# Patient Record
Sex: FEMALE | Race: BLACK OR AFRICAN AMERICAN | NOT HISPANIC OR LATINO | Employment: OTHER | URBAN - NONMETROPOLITAN AREA
[De-identification: names, ages, dates, MRNs, and addresses within clinical notes are randomized per-mention and may not be internally consistent; named-entity substitution may affect disease eponyms.]

---

## 2023-06-06 ENCOUNTER — OFFICE VISIT (OUTPATIENT)
Dept: NEUROSURGERY | Facility: CLINIC | Age: 48
End: 2023-06-06
Payer: MEDICARE

## 2023-06-06 ENCOUNTER — HOSPITAL ENCOUNTER (OUTPATIENT)
Dept: GENERAL RADIOLOGY | Facility: HOSPITAL | Age: 48
Discharge: HOME | End: 2023-06-06
Admitting: NURSE PRACTITIONER
Payer: MEDICARE

## 2023-06-06 VITALS — WEIGHT: 256.8 LBS | HEIGHT: 64 IN | BODY MASS INDEX: 43.84 KG/M2

## 2023-06-06 DIAGNOSIS — M79.604 PAIN OF RIGHT LOWER EXTREMITY: ICD-10-CM

## 2023-06-06 DIAGNOSIS — R20.0 NUMBNESS AND TINGLING OF RIGHT LOWER EXTREMITY: ICD-10-CM

## 2023-06-06 DIAGNOSIS — M54.50 LUMBAR BACK PAIN: Primary | ICD-10-CM

## 2023-06-06 DIAGNOSIS — Z72.0 TOBACCO ABUSE: ICD-10-CM

## 2023-06-06 DIAGNOSIS — R20.2 NUMBNESS AND TINGLING OF RIGHT LOWER EXTREMITY: ICD-10-CM

## 2023-06-06 DIAGNOSIS — M54.50 LUMBAR BACK PAIN: ICD-10-CM

## 2023-06-06 DIAGNOSIS — E66.01 CLASS 3 SEVERE OBESITY DUE TO EXCESS CALORIES WITH SERIOUS COMORBIDITY AND BODY MASS INDEX (BMI) OF 40.0 TO 44.9 IN ADULT: ICD-10-CM

## 2023-06-06 PROCEDURE — 1159F MED LIST DOCD IN RCRD: CPT | Performed by: NURSE PRACTITIONER

## 2023-06-06 PROCEDURE — 99204 OFFICE O/P NEW MOD 45 MIN: CPT | Performed by: NURSE PRACTITIONER

## 2023-06-06 PROCEDURE — 72114 X-RAY EXAM L-S SPINE BENDING: CPT

## 2023-06-06 PROCEDURE — 1160F RVW MEDS BY RX/DR IN RCRD: CPT | Performed by: NURSE PRACTITIONER

## 2023-06-06 RX ORDER — DAPAGLIFLOZIN 10 MG/1
TABLET, FILM COATED ORAL EVERY 24 HOURS
COMMUNITY

## 2023-06-06 RX ORDER — LEVOTHYROXINE SODIUM 200 MCG
TABLET ORAL EVERY 24 HOURS
COMMUNITY

## 2023-06-06 RX ORDER — DULAGLUTIDE 0.75 MG/.5ML
INJECTION, SOLUTION SUBCUTANEOUS
COMMUNITY
Start: 2023-04-05

## 2023-06-06 RX ORDER — ALLOPURINOL 100 MG/1
TABLET ORAL EVERY 12 HOURS SCHEDULED
COMMUNITY

## 2023-06-06 RX ORDER — CYCLOBENZAPRINE HCL 10 MG
TABLET ORAL
COMMUNITY

## 2023-06-06 NOTE — PROGRESS NOTES
Primary Care Provider: Melba Alvarado APRN  Requesting Provider: Melba Alavrado APRN    Chief Complaint:   Chief Complaint   Patient presents with    Back Pain     Pt is here with complaints of back pain, SI joint pain and rt leg pain.       History of Present Illness  Consultation at the request of NAVEEN Laboy.    HPI:  Kathy Betancourt is a 47 y.o. female who presents today with a complaint of lumbar back and right leg pain, 50% back, 50% right leg.  History of right SI joint fusion performed at [place] for right SI joint pain that did not improve postoperatively.    Gradual onset of lumbar back pain over the past 20+ years with progressively worsening discomfort over the past year.  Her lumbar back pain is constant, waxing and waning in severity, primarily bilateral SI joint pain, right > left.  Her discomfort radiates to the right groin and extends down the medial thigh to the knee.  She additionally reports intermittent numbness and tingling in the same distribution.  She denies lower extremity weakness or falls, however reports severe right knee pain due to a torn right meniscus and Baker's cyst.  She is currently under the care of [facility].  Her back and right leg pain worsen with prolonged sitting, standing, walking, physical activity.  Minimal alleviation of her discomfort with application of ice and/or heat, ibuprofen, Tylenol arthritis, and Flexeril.  She trialed Celebrex, however was unable to tolerate medication due to fluid retention.  Ms. Betancourt denies fevers, chills, night sweats, unexplained weight loss, saddle anesthesia, or bowel bladder dysfunction. He currently rates the severity of her symptoms 10/10.  No additional concerns at this time.  Vitamin D deficiency,    Ms. Betancourt has not completed a dedicated course of physician directed physical therapy, massage care, chiropractic care, nor been evaluated by pain management.      Oswestry Disability Index = 68%   Score   Pain  Intensity Very severe pain-4   Personal Care I can look after myself but it is slow and painful-2   Lifting Medium weights off a table-3   Walking Pain prevents > 100 yards-3   Sitting Pain prevents sitting > 10 min-4   Standing Pain limits standing to < 10 min-4   Sleeping Can only sleep < 2 hrs-4   Sex Life (if applicable) Sex severely restricted by pain-4   Social Life Pain restricts me to my home-4   Traveling Pain restricts to <30 min-4   (Ridgeway et al, 1980)    SCORE INTERPRETATION OF THE OSWESTRY LBP DISABILITY QUESTIONNAIRE     60-80% Crippled Back pain impinges on all aspects of these patients' lives both at home and at work. Positive intervention is required.     ROS  Review of Systems   Constitutional:  Positive for activity change, appetite change, chills and fatigue.   HENT:  Positive for sinus pressure and sneezing.    Eyes: Negative.    Respiratory:  Positive for apnea.    Cardiovascular: Negative.    Gastrointestinal: Negative.    Endocrine: Positive for cold intolerance and heat intolerance.   Genitourinary: Negative.    Musculoskeletal:  Positive for back pain, joint swelling, neck pain and neck stiffness.   Skin: Negative.    Allergic/Immunologic: Positive for environmental allergies and food allergies.   Neurological: Negative.    Hematological: Negative.    Psychiatric/Behavioral: Negative.     All other systems reviewed and are negative.    Past Medical History:   Diagnosis Date    Arthritis     Asthma     Low back pain      Past Surgical History:   Procedure Laterality Date    APPENDECTOMY      CHOLECYSTECTOMY      HERNIA REPAIR       Family History: family history includes Heart disease in her father; Liver disease in her mother.    Social History:  reports that she has been smoking cigarettes. She has never used smokeless tobacco. Drug use questions deferred to the physician. She reports that she does not drink alcohol.    Medications:    Current Outpatient Medications:     allopurinol  "(ZYLOPRIM) 100 MG tablet, Every 12 (Twelve) Hours., Disp: , Rfl:     Cholecalciferol 50 MCG (2000 UT) tablet, Daily., Disp: , Rfl:     cyclobenzaprine (FLEXERIL) 10 MG tablet, 1 tablet at bedtime as needed Orally as needed, Disp: , Rfl:     dapagliflozin Propanediol (Farxiga) 10 MG tablet, Daily., Disp: , Rfl:     Synthroid 200 MCG tablet, Daily., Disp: , Rfl:     Trulicity 0.75 MG/0.5ML solution pen-injector, INJECT 0.75 MG SUBCUTANEOUSLY ONCE A WEEK FOR 28 DAYS, Disp: , Rfl:     Allergies:  Metformin and Morphine    Objective   Ht 162.6 cm (64\")   Wt 116 kg (256 lb 12.8 oz)   BMI 44.08 kg/m²   Physical Exam  Vitals and nursing note reviewed.   Constitutional:       General: She is not in acute distress.     Appearance: Normal appearance. She is well-developed and well-groomed. She is morbidly obese. She is not ill-appearing, toxic-appearing or diaphoretic.      Comments: BMI 44.08   HENT:      Head: Normocephalic and atraumatic.      Right Ear: Hearing normal.      Left Ear: Hearing normal.   Eyes:      Extraocular Movements: EOM normal.      Conjunctiva/sclera: Conjunctivae normal.      Pupils: Pupils are equal, round, and reactive to light.   Neck:      Trachea: Trachea normal.   Cardiovascular:      Rate and Rhythm: Normal rate and regular rhythm.   Pulmonary:      Effort: Pulmonary effort is normal. No tachypnea, bradypnea, accessory muscle usage or respiratory distress.   Abdominal:      Palpations: Abdomen is soft.   Musculoskeletal:      Cervical back: Full passive range of motion without pain and neck supple.   Skin:     General: Skin is warm and dry.   Neurological:      Mental Status: She is alert and oriented to person, place, and time.      GCS: GCS eye subscore is 4. GCS verbal subscore is 5. GCS motor subscore is 6.      Deep Tendon Reflexes:      Reflex Scores:       Tricep reflexes are 1+ on the right side and 1+ on the left side.       Bicep reflexes are 1+ on the right side and 1+ on the left " side.       Brachioradialis reflexes are 1+ on the right side and 1+ on the left side.       Patellar reflexes are 1+ on the right side and 1+ on the left side.       Achilles reflexes are 0 on the right side and 0 on the left side.  Psychiatric:         Speech: Speech normal.         Behavior: Behavior normal. Behavior is cooperative.     Neurologic Exam     Mental Status   Oriented to person, place, and time.   Attention: normal. Concentration: normal.   Speech: speech is normal   Level of consciousness: alert    Cranial Nerves     CN II   Visual fields full to confrontation.     CN III, IV, VI   Pupils are equal, round, and reactive to light.  Extraocular motions are normal.     CN V   Facial sensation intact.     CN VII   Facial expression full, symmetric.     CN VIII   CN VIII normal.     CN IX, X   CN IX normal.     CN XI   CN XI normal.     Motor Exam   Right arm tone: normal  Left arm tone: normal  Right leg tone: normal  Left leg tone: normal    Strength   Right deltoid: 5/5  Left deltoid: 5/5  Right biceps: 5/5  Left biceps: 5/5  Right triceps: 5/5  Left triceps: 5/5  Right wrist extension: 5/5  Left wrist extension: 5/5  Right iliopsoas: 5/5  Left iliopsoas: 5/5  Right quadriceps: 5/5  Left quadriceps: 5/5  Right anterior tibial: 5/5  Left anterior tibial: 5/5  Right gastroc: 5/5  Left gastroc: 5/5  Right EHL 5/5  Left EHL 5/5       Sensory Exam   Right arm light touch: normal  Left arm light touch: normal  Right leg light touch: normal  Left leg light touch: normal    Gait, Coordination, and Reflexes     Tremor   Resting tremor: absent  Intention tremor: absent  Action tremor: absent    Reflexes   Right brachioradialis: 1+  Left brachioradialis: 1+  Right biceps: 1+  Left biceps: 1+  Right triceps: 1+  Left triceps: 1+  Right patellar: 1+  Left patellar: 1+  Right achilles: 0  Left achilles: 0  Right plantar: normal  Left plantar: normal  Right Osullivan: absent  Left Osullivan: absent  Right ankle clonus:  absent  Left ankle clonus: absent  Right pendular knee jerk: absent  Left pendular knee jerk: absent  Antalgic gait     Imaging: (independent review and interpretation)  No radiology results for the last 30 days.     ASSESSMENT and PLAN  Kathy Betancourt is a 47 y.o. female with a significant medical history of right SI joint fusion (2021), vitamin D deficiency, hypothyroidism, diabetes, tobacco abuse, and morbid obesity.  She presents with a new problem of axial low back pain and right medial thigh pain, 50% back, 50% right leg. CHELSEA: 69.  Physical exam findings of gross hyporeflexia and an antalgic gait.  No radiology results for the last 30 days.    RECOMMENDATIONS ...  Lumbar back pain  Bilateral SI joint pain, right greater than left  History of right SI joint fusion (2021)  Right medial thigh pain and dysesthesias  Differential diagnosis include, but are not limited to spondylolisthesis with concern for mechanical instability, degenerative facet arthropathy, Lumbar stenosis with right lower extremity radiculopathy, spondyloarthropathies including ankylosis spondylitis ([id number]) or Paget's disease  , fibromyalgia or other rheumatological disease, malingering, conversion disorder, and secondary gain are diagnoses of exclusion, or failure of right SI joint fusion .     For further evaluation we will proceed today by obtaining x-rays of the lumbar spine complete with flexion and extension to assess for areas of instability.  As a means of first-line conservative management for lumbar back pain, we will send  Kathy for a dedicated course of physician directed physical therapy; Rx provided.  We will have her return for reassessment with me after completion of physical therapy.  I advised the patient to call to return sooner for new or worsening complaints of weakness, paresthesias, gait disturbances, or any additional concerns.  Treatment options discussed in detail with Kathy and she voiced understanding.  Ms. Betancourt agrees  with this plan of care.    Tobacco abuse  The patient understands the many dangers of continuing to use tobacco.  If quitting becomes an increased priority Kathy knows to contact us for help with quitting.       Class 3 obesity (BMI >= 40) due to excessive calories with serious comorbidities BMI of 40.0-44.9 in adult  Body mass index is 44.08 kg/m². Information on the DASH diet provided in the AVS.  We will continue to provided diet and exercise information with the goal of weight loss at each scheduled appointment.     Diagnoses and all orders for this visit:    1. Lumbar back pain (Primary)  -     XR Spine Lumbar Complete With Flex & Ext; Future  -     Ambulatory Referral to Physical Therapy Evaluate and treat (2 to 3 times weekly for 6 weeks)    2. Pain of right lower extremity  -     XR Spine Lumbar Complete With Flex & Ext; Future  -     Ambulatory Referral to Physical Therapy Evaluate and treat (2 to 3 times weekly for 6 weeks)    3. Numbness and tingling of right lower extremity  -     XR Spine Lumbar Complete With Flex & Ext; Future  -     Ambulatory Referral to Physical Therapy Evaluate and treat (2 to 3 times weekly for 6 weeks)    4. Tobacco abuse    5. Class 3 severe obesity due to excess calories with serious comorbidity and body mass index (BMI) of 40.0 to 44.9 in adult      Return for FOLLOW WITH ANSELMO AFTER COMPLETION OF PT.    Thank you for this Consultation and the opportunity to participate in Kathy's care.    Sincerely,  Anselmo Maciel, NAVEEN    Medical Decision Making (2/3)  Problem Points (2,3,4 or more)  New Problem, additional workup = 4x1  Data Points (2,3,4 or more)  Ordered Imaging (X-ray,CT, MRI) = 1x1.  Risk (Low, Mod, High)  Undiagnosed New problem (Moderate)    E/M = MDM 3 out of 3   or   TIME  New Level 4 - 52938 = Mod (3, 3, Mod)   or   45-59 minutes

## 2023-06-06 NOTE — PATIENT INSTRUCTIONS
"DASH Eating Plan  DASH stands for Dietary Approaches to Stop Hypertension. The DASH eating plan is a healthy eating plan that has been shown to:  Reduce high blood pressure (hypertension).  Reduce your risk for type 2 diabetes, heart disease, and stroke.  Help with weight loss.  What are tips for following this plan?  Reading food labels  Check food labels for the amount of salt (sodium) per serving. Choose foods with less than 5 percent of the Daily Value of sodium. Generally, foods with less than 300 milligrams (mg) of sodium per serving fit into this eating plan.  To find whole grains, look for the word \"whole\" as the first word in the ingredient list.  Shopping  Buy products labeled as \"low-sodium\" or \"no salt added.\"  Buy fresh foods. Avoid canned foods and pre-made or frozen meals.  Cooking  Avoid adding salt when cooking. Use salt-free seasonings or herbs instead of table salt or sea salt. Check with your health care provider or pharmacist before using salt substitutes.  Do not vázquez foods. Cook foods using healthy methods such as baking, boiling, grilling, roasting, and broiling instead.  Cook with heart-healthy oils, such as olive, canola, avocado, soybean, or sunflower oil.  Meal planning    Eat a balanced diet that includes:  4 or more servings of fruits and 4 or more servings of vegetables each day. Try to fill one-half of your plate with fruits and vegetables.  6-8 servings of whole grains each day.  Less than 6 oz (170 g) of lean meat, poultry, or fish each day. A 3-oz (85-g) serving of meat is about the same size as a deck of cards. One egg equals 1 oz (28 g).  2-3 servings of low-fat dairy each day. One serving is 1 cup (237 mL).  1 serving of nuts, seeds, or beans 5 times each week.  2-3 servings of heart-healthy fats. Healthy fats called omega-3 fatty acids are found in foods such as walnuts, flaxseeds, fortified milks, and eggs. These fats are also found in cold-water fish, such as sardines, salmon, " and mackerel.  Limit how much you eat of:  Canned or prepackaged foods.  Food that is high in trans fat, such as some fried foods.  Food that is high in saturated fat, such as fatty meat.  Desserts and other sweets, sugary drinks, and other foods with added sugar.  Full-fat dairy products.  Do not salt foods before eating.  Do not eat more than 4 egg yolks a week.  Try to eat at least 2 vegetarian meals a week.  Eat more home-cooked food and less restaurant, buffet, and fast food.  Lifestyle  When eating at a restaurant, ask that your food be prepared with less salt or no salt, if possible.  If you drink alcohol:  Limit how much you use to:  0-1 drink a day for women who are not pregnant.  0-2 drinks a day for men.  Be aware of how much alcohol is in your drink. In the [place] one drink equals one 12 oz bottle of beer (355 mL), one 5 oz glass of wine (148 mL), or one 1½ oz glass of hard liquor (44 mL).  General information  Avoid eating more than 2,300 mg of salt a day. If you have hypertension, you may need to reduce your sodium intake to 1,500 mg a day.  Work with your health care provider to maintain a healthy body weight or to lose weight. Ask what an ideal weight is for you.  Get at least 30 minutes of exercise that causes your heart to beat faster (aerobic exercise) most days of the week. Activities may include walking, swimming, or biking.  Work with your health care provider or dietitian to adjust your eating plan to your individual calorie needs.  What foods should I eat?  Fruits  All fresh, dried, or frozen fruit. Canned fruit in natural juice (without added sugar).  Vegetables  Fresh or frozen vegetables (raw, steamed, roasted, or grilled). Low-sodium or reduced-sodium tomato and vegetable juice. Low-sodium or reduced-sodium tomato sauce and tomato paste. Low-sodium or reduced-sodium canned vegetables.  Grains  Whole-grain or whole-wheat bread. Whole-grain or whole-wheat pasta. Brown rice. Oatmeal. Quinoa.  Bulgur. Whole-grain and low-sodium cereals. Megan bread. Low-fat, low-sodium crackers. Whole-wheat flour tortillas.  Meats and other proteins  Skinless chicken or turkey. Ground chicken or turkey. Pork with fat trimmed off. Fish and seafood. Egg whites. Dried beans, peas, or lentils. Unsalted nuts, nut butters, and seeds. Unsalted canned beans. Lean cuts of beef with fat trimmed off. Low-sodium, lean precooked or cured meat, such as sausages or meat loaves.  Dairy  Low-fat (1%) or fat-free (skim) milk. Reduced-fat, low-fat, or fat-free cheeses. Nonfat, low-sodium ricotta or cottage cheese. Low-fat or nonfat yogurt. Low-fat, low-sodium cheese.  Fats and oils  Soft margarine without trans fats. Vegetable oil. Reduced-fat, low-fat, or light mayonnaise and salad dressings (reduced-sodium). Canola, safflower, olive, avocado, soybean, and sunflower oils. Avocado.  Seasonings and condiments  Herbs. Spices. Seasoning mixes without salt.  Other foods  Unsalted popcorn and pretzels. Fat-free sweets.  The items listed above may not be a complete list of foods and beverages you can eat. Contact a dietitian for more information.  What foods should I avoid?  Fruits  Canned fruit in a light or heavy syrup. Fried fruit. Fruit in cream or butter sauce.  Vegetables  Creamed or fried vegetables. Vegetables in a cheese sauce. Regular canned vegetables (not low-sodium or reduced-sodium). Regular canned tomato sauce and paste (not low-sodium or reduced-sodium). Regular tomato and vegetable juice (not low-sodium or reduced-sodium). Pickles. Olives.  Grains  Baked goods made with fat, such as croissants, muffins, or some breads. Dry pasta or rice meal packs.  Meats and other proteins  Fatty cuts of meat. Ribs. Fried meat. Orta. Bologna, salami, and other precooked or cured meats, such as sausages or meat loaves. Fat from the back of a pig (fatback). Bratwurst. Salted nuts and seeds. Canned beans with added salt. Canned or smoked fish.  Whole eggs or egg yolks. Chicken or turkey with skin.  Dairy  Whole or 2% milk, cream, and half-and-half. Whole or full-fat cream cheese. Whole-fat or sweetened yogurt. Full-fat cheese. Nondairy creamers. Whipped toppings. Processed cheese and cheese spreads.  Fats and oils  Butter. Stick margarine. Lard. Shortening. Ghee. Orta fat. Tropical oils, such as coconut, palm kernel, or palm oil.  Seasonings and condiments  Onion salt, garlic salt, seasoned salt, table salt, and sea salt. Worcestershire sauce. Tartar sauce. Barbecue sauce. Teriyaki sauce. Soy sauce, including reduced-sodium. Steak sauce. Canned and packaged gravies. Fish sauce. Oyster sauce. Cocktail sauce. Store-bought horseradish. Ketchup. Mustard. Meat flavorings and tenderizers. Bouillon cubes. Hot sauces. Pre-made or packaged marinades. Pre-made or packaged taco seasonings. Relishes. Regular salad dressings.  Other foods  Salted popcorn and pretzels.  The items listed above may not be a complete list of foods and beverages you should avoid. Contact a dietitian for more information.  Where to find more information  National Heart, Lung, and Blood [place]: www.nhlbi.nih.gov  American Heart Association: www.heart.org  Academy of Nutrition and Dietetics: www.eatright.org  National Kidney Foundation: www.kidney.org  Summary  The DASH eating plan is a healthy eating plan that has been shown to reduce high blood pressure (hypertension). It may also reduce your risk for type 2 diabetes, heart disease, and stroke.  When on the DASH eating plan, aim to eat more fresh fruits and vegetables, whole grains, lean proteins, low-fat dairy, and heart-healthy fats.  With the DASH eating plan, you should limit salt (sodium) intake to 2,300 mg a day. If you have hypertension, you may need to reduce your sodium intake to 1,500 mg a day.  Work with your health care provider or dietitian to adjust your eating plan to your individual calorie needs.  This information is not  intended to replace advice given to you by your health care provider. Make sure you discuss any questions you have with your health care provider.  Document Revised: 11/20/2020 Document Reviewed: 11/20/2020  Elsevier Patient Education © 2022 [employer]  Tobacco Use Disorder  Tobacco use disorder (TUD) occurs when a person craves, seeks, and uses tobacco, regardless of the consequences. This disorder can cause problems with mental and physical health. It can affect your ability to have healthy relationships, and it can keep you from meeting your responsibilities at work, home, or school.  Tobacco may be:  Smoked as a cigarette or cigar.  Inhaled using e-cigarettes.  Smoked in a pipe or hookah.  Chewed as smokeless tobacco.  Inhaled into the nostrils as snuff.  Tobacco products contain a dangerous chemical called nicotine, which is very addictive. Nicotine triggers hormones that make the body feel stimulated and works on areas of the brain that make you feel good. These effects can make it hard for people to quit nicotine.  Tobacco contains many other unsafe chemicals that can damage almost every organ in the body. Smoking tobacco also puts others in danger due to fire risk and possible health problems caused by breathing in secondhand smoke.  What are the signs or symptoms?  Symptoms of TUD may include:  Being unable to slow down or stop your tobacco use.  Spending an abnormal amount of time getting or using tobacco.  Craving tobacco. Cravings may last for up to 6 months after quitting.  Tobacco use that:  Interferes with your work, school, or home life.  Interferes with your personal and social relationships.  Makes you give up activities that you once enjoyed or found important.  Using tobacco even though you know that it is:  Dangerous or bad for your health or someone else's health.  Causing problems in your life.  Needing more and more of the substance to get the same effect (developing  tolerance).  Experiencing unpleasant symptoms if you do not use the substance (withdrawal). Withdrawal symptoms may include:  Depressed, anxious, or irritable mood.  Difficulty concentrating.  Increased appetite.  Restlessness or trouble sleeping.  Using the substance to avoid withdrawal.  How is this diagnosed?  This condition may be diagnosed based on:  Your current and past tobacco use. Your health care provider may ask questions about how your tobacco use affects your life.  A physical exam.  You may be diagnosed with TUD if you have at least two symptoms within a 12-month period.  How is this treated?  This condition is treated by stopping tobacco use. Many people are unable to quit on their own and need help. Treatment may include:  Nicotine replacement therapy (NRT). NRT provides nicotine without the other harmful chemicals in tobacco. NRT gradually lowers the dosage of nicotine in the body and reduces withdrawal symptoms. NRT is available as:  Over-the-counter gums, lozenges, and skin patches.  Prescription mouth inhalers and nasal sprays.  Medicine that acts on the brain to reduce cravings and withdrawal symptoms.  A type of talk therapy that examines your triggers for tobacco use, how to avoid them, and how to cope with cravings (behavioral therapy).  Hypnosis. This may help with withdrawal symptoms.  Joining a support group for others coping with TUD.  The best treatment for TUD is usually a combination of medicine, talk therapy, and support groups. Recovery can be a long process. Many people start using tobacco again after stopping (relapse). If you relapse, it does not mean that treatment will not work.  Follow these instructions at home:  Lifestyle  Do not use any products that contain nicotine or tobacco, such as cigarettes and e-cigarettes.  Avoid things that trigger tobacco use as much as you can. Triggers include people and situations that usually cause you to use tobacco.  Avoid drinks that  contain caffeine, including coffee. These may worsen some withdrawal symptoms.  Find ways to manage stress. Wanting to smoke may cause stress, and stress can make you want to smoke. Relaxation techniques such as deep breathing, meditation, and yoga may help.  Attend support groups as needed. These groups are an important part of long-term recovery for many people.  General instructions  Take over-the-counter and prescription medicines only as told by your health care provider.  Check with your health care provider before taking any new prescription or over-the-counter medicines.  Decide on a friend, family member, or smoking quit-line (such as 1-800-QUIT-NOW in the [place] that you can call or text when you feel the urge to smoke or when you need help coping with cravings.  Keep all follow-up visits as told by your health care provider and therapist. This is important.  Contact a health care provider if:  You are not able to take your medicines as prescribed.  Your symptoms get worse, even with treatment.  Summary  Tobacco use disorder (TUD) occurs when a person craves, seeks, and uses tobacco regardless of the consequences.  This condition may be diagnosed based on your current and past tobacco use and a physical exam.  Many people are unable to quit on their own and need help. Recovery can be a long process.  The most effective treatment for TUD is usually a combination of medicine, talk therapy, and support groups.  This information is not intended to replace advice given to you by your health care provider. Make sure you discuss any questions you have with your health care provider.  Document Revised: 08/26/2022 Document Reviewed: 11/09/2021  Elsevier Patient Education © 2022 [employer]

## 2023-08-03 ENCOUNTER — OFFICE VISIT (OUTPATIENT)
Dept: NEUROSURGERY | Facility: CLINIC | Age: 48
End: 2023-08-03
Payer: MEDICARE

## 2023-08-03 VITALS — WEIGHT: 256 LBS | HEIGHT: 64 IN | BODY MASS INDEX: 43.71 KG/M2

## 2023-08-03 DIAGNOSIS — M54.50 LUMBAR BACK PAIN: Primary | ICD-10-CM

## 2023-08-03 DIAGNOSIS — E66.01 CLASS 3 SEVERE OBESITY DUE TO EXCESS CALORIES WITHOUT SERIOUS COMORBIDITY WITH BODY MASS INDEX (BMI) OF 40.0 TO 44.9 IN ADULT: ICD-10-CM

## 2023-08-03 DIAGNOSIS — M79.604 PAIN OF RIGHT LOWER EXTREMITY: ICD-10-CM

## 2023-08-03 PROCEDURE — 1159F MED LIST DOCD IN RCRD: CPT | Performed by: NURSE PRACTITIONER

## 2023-08-03 PROCEDURE — 1160F RVW MEDS BY RX/DR IN RCRD: CPT | Performed by: NURSE PRACTITIONER

## 2023-08-03 PROCEDURE — 99213 OFFICE O/P EST LOW 20 MIN: CPT | Performed by: NURSE PRACTITIONER

## 2023-08-16 ENCOUNTER — HOSPITAL ENCOUNTER (OUTPATIENT)
Dept: PHYSICAL THERAPY | Facility: HOSPITAL | Age: 48
Setting detail: THERAPIES SERIES
Discharge: HOME | End: 2023-08-16
Payer: MEDICARE

## 2023-08-16 DIAGNOSIS — M54.50 LUMBAR BACK PAIN: Primary | ICD-10-CM

## 2023-08-16 DIAGNOSIS — M79.604 PAIN OF RIGHT LOWER EXTREMITY: ICD-10-CM

## 2023-08-16 PROCEDURE — 97162 PT EVAL MOD COMPLEX 30 MIN: CPT
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
## 2023-08-17 ENCOUNTER — HOSPITAL ENCOUNTER (OUTPATIENT)
Dept: PHYSICAL THERAPY | Facility: HOSPITAL | Age: 48
Setting detail: THERAPIES SERIES
Discharge: HOME | End: 2023-08-17
Payer: MEDICARE

## 2023-08-17 DIAGNOSIS — M54.50 LUMBAR BACK PAIN: Primary | ICD-10-CM

## 2023-08-17 DIAGNOSIS — M79.604 PAIN OF RIGHT LOWER EXTREMITY: ICD-10-CM

## 2023-08-17 PROCEDURE — 97110 THERAPEUTIC EXERCISES: CPT

## 2023-08-17 NOTE — THERAPY TREATMENT NOTE
"  Outpatient Physical Therapy Ortho Treatment Note  [facility]     Patient Name: Kathy Betancourt  : 1975  MRN: [id number]  Today's Date: 2023    Pt seen for 2 PT sessions  Reported Improvement:  N/A %  MD Visit: 2023  Recheck Date: 2023    Therapy Diagnosis:  Chronic LBP/SI joint pain, unspecified          Visit Date: 2023    Visit Dx:    ICD-10-CM ICD-9-CM   1. Lumbar back pain  M54.50 724.2   2. Pain of right lower extremity  M79.604 729.5       There is no problem list on file for this patient.       Past Medical History:   Diagnosis Date    Arthritis     Asthma     Diabetes mellitus     Disease of thyroid gland     GERD (gastroesophageal reflux disease)     Low back pain     Sleep apnea         Past Surgical History:   Procedure Laterality Date    APPENDECTOMY      CHOLECYSTECTOMY      HERNIA REPAIR      LUMBAR FUSION  2021    SI joint - R side                        PT Assessment/Plan       Row Name 23 1100          PT Assessment    Assessment Comments Pt reports having R knee pain and pain in the R hip.  States she had a procedure past  on R knee but is bone on bone.  Pt has lengthy history of previous procedures related to B LE\"s and low back.  Pt reports compliance with HEP  Pt states she i needing an MRI but has to complete 6 PT visits for approval.  GOod effort with all therex tihs date. .Decreased pain post tx session .  -ANGIE        PT Plan    PT Frequency 2x/week  -ANGIE     PT Plan Comments Add HL Hip ABD/ADD next visit  -ANGIE               User Key  (r) = Recorded By, (t) = Taken By, (c) = Cosigned By      Initials Name Provider Type    Kacie Dawkins PTA Physical Therapist Assistant                       OP Exercises       Row Name 23 1100             Subjective Comments    Subjective Comments Back isnt too bad, pain in this R hip.  Walked my dog this morning and popped my knee (R)  -ANGIE         Subjective Pain    Able to rate subjective pain? yes  -ANGIE   " "   Pre-Treatment Pain Level 7  -      Post-Treatment Pain Level 4  -         Exercise 1    Exercise Name 1 Pro II LE bike for strength endurance and posturing.  -      Time 1 10 min  -      Additional Comments L 4.0  -         Exercise 2    Exercise Name 2 B st HS St  -JW      Reps 2 2  -JW      Time 2 30  -JW         Exercise 3    Exercise Name 3 B seated piriformis st  -JW      Reps 3 2  -JW      Time 3 30  -JW         Exercise 4    Exercise Name 4 Pball Roll outs  -      Time 4 3 min with 5 sec hold ea position  -JW         Exercise 5    Exercise Name 5 Sit to stands with gentle extension  -      Sets 5 1  -JW      Reps 5 10  -JW      Time 5 5\" hold  -JW         Exercise 6    Exercise Name 6 LTR  -JW         Exercise 7    Exercise Name 7 PPT with TrA  -JW         Exercise 8    Exercise Name 8 --  -                User Key  (r) = Recorded By, (t) = Taken By, (c) = Cosigned By      Initials Name Provider Type    Kacie Dawkins, PTA Physical Therapist Assistant                                  PT OP Goals       Row Name 08/17/23 1100          PT Short Term Goals    STG Date to Achieve 09/05/23  -     STG 1 Patient to be I with HEP with additions/changes prior to next recertification.  -     STG 1 Progress Ongoing  -     STG 2 Patient to demo proper log roll technique with no cues from PT.  -     STG 3 R LE strength >/= 4+/5.  -     STG 4 Trunk flexion AROM >/= 85ø.  -     STG 5 Patient to perform 20 bridges with UE \"X\" position with good form and no increase in pain.  -        Long Term Goals    LTG 1 I with final HEP and self management of s/s.  -     LTG 2 B LE strenght 5/5.  -     LTG 3 Patient to participate in ergonomics training with verbalization of understanding.  -     LTG 4 Patient to demo proper lifting mechanics with no increase in pain and no cues from PT.  -     LTG 5 Patient to have AROM for the lumbar spine all WNL, no increase in pain.  -     LTG 6 Patient " able to perform 10 minutes of standing core stab activities with good PN and no increase in pain.  -ANGIE     LTG 7 Patient able to perform 10 minutes of seated DD core stab activities with good PN and no increase in pain.  -ANGIE        Time Calculation    PT Goal Re-Cert Due Date 09/05/23  -ANGIE               User Key  (r) = Recorded By, (t) = Taken By, (c) = Cosigned By      Initials Name Provider Type    Kacie Dawkins PTA Physical Therapist Assistant                                   Time Calculation:   Start Time: 1128  Stop Time: 1213  Time Calculation (min): 45 min  Therapy Charges for Today       Code Description Service Date Service Provider Modifiers Qty    17229290134 HC PT THER PROC EA 15 MIN 8/17/2023 Kacie Pickering PTA GP, CQ 3    49688024140 HC PT THER SUPP EA 15 MIN 8/17/2023 Kacie Pickering PTA GP, CQ 1                      Kacie Pickering PTA  8/17/2023

## 2023-08-22 ENCOUNTER — HOSPITAL ENCOUNTER (OUTPATIENT)
Dept: PHYSICAL THERAPY | Facility: HOSPITAL | Age: 48
Setting detail: THERAPIES SERIES
Discharge: HOME | End: 2023-08-22
Payer: MEDICARE

## 2023-08-22 DIAGNOSIS — M79.604 PAIN OF RIGHT LOWER EXTREMITY: ICD-10-CM

## 2023-08-22 DIAGNOSIS — M54.50 LUMBAR BACK PAIN: Primary | ICD-10-CM

## 2023-08-22 PROCEDURE — 97110 THERAPEUTIC EXERCISES: CPT | Performed by: PHYSICAL THERAPIST

## 2023-08-22 PROCEDURE — 97110 THERAPEUTIC EXERCISES: CPT

## 2023-08-22 NOTE — THERAPY TREATMENT NOTE
Outpatient Physical Therapy Ortho Treatment Note  [facility]     Patient Name: Kathy Betancourt  : 1975  MRN: [id number]  Today's Date: 2023      Visit Date: 2023    Pt seen for 3 PT sessions  Reported Improvement:  N/A %  MD Visit: 2023  Recheck Date: 2023     Therapy Diagnosis:  Chronic LBP/SI joint pain, unspecified      Visit Dx:    ICD-10-CM ICD-9-CM   1. Lumbar back pain  M54.50 724.2   2. Pain of right lower extremity  M79.604 729.5       There is no problem list on file for this patient.       Past Medical History:   Diagnosis Date    Arthritis     Asthma     Diabetes mellitus     Disease of thyroid gland     GERD (gastroesophageal reflux disease)     Low back pain     Sleep apnea         Past Surgical History:   Procedure Laterality Date    APPENDECTOMY      CHOLECYSTECTOMY      HERNIA REPAIR      LUMBAR FUSION  2021    SI joint - R side        PT Ortho       Row Name 23 0800       Subjective Comments    Subjective Comments Patient reports her LB is just sore but her R hip is painful.  -       Precautions and Contraindications    Precautions/Limitations no known precautions/limitations  -       Subjective Pain    Able to rate subjective pain? yes  -    Pre-Treatment Pain Level 7  -    Subjective Pain Comment R hip  -              User Key  (r) = Recorded By, (t) = Taken By, (c) = Cosigned By      Initials Name Provider Type    Jayashree Fox, PT DPT Physical Therapist                                 PT Assessment/Plan       Row Name 23 0900 23 0800       PT Assessment    Assessment Comments patient needs cueing for transab contractions as well as log roll technique for spinal protection. she does report reduced pain post treatment  - Patient required cues for initial HEP. Patient had a slight anterior R ROT in pelvis which corrected easily with just some LAD to the R LE for a few minutes. Then concnetrated on SI/core stab  -AJ        "PT Plan    PT Frequency 2x/week  - 2x/week  -AJ    PT Plan Comments nxt visit review lifting mechanics and start seated core stab  - Monitor SI alignment.  -AJ              User Key  (r) = Recorded By, (t) = Taken By, (c) = Cosigned By      Initials Name Provider Type     Jackelin Tsang, PTA Physical Therapist Assistant    Jayashree Fox, PT DPT Physical Therapist                       OP Exercises       Row Name 08/22/23 0800             Subjective Comments    Subjective Comments Patient reports her LB is just sore but her R hip is painful.  -AJ         Subjective Pain    Able to rate subjective pain? yes  -AJ      Pre-Treatment Pain Level 7  -AJ      Post-Treatment Pain Level --  5-6/10  -      Subjective Pain Comment R hip  -AJ         Exercise 1    Exercise Name 1 Pro II LE bike for strength endurance and posturing.  -AJ      Time 1 10 min  -AJ      Additional Comments L 5.0  -AJ         Exercise 2    Exercise Name 2 B St HS S  -AJ      Reps 2 2  -AJ      Time 2 30 seconds  -AJ         Exercise 3    Exercise Name 3 B sitting piriformis S  -AJ      Reps 3 2  -AJ      Time 3 30 seconds  -AJ         Exercise 4    Exercise Name 4 Pball Roll outs 3-way  -AJ      Time 4 3 min with 5-10 sec hold ea position  -AJ         Exercise 5    Exercise Name 5 aligment check  -AJ      Additional Comments R LE ~1/4\" short with anterior ROT  -AJ         Exercise 6    Exercise Name 6 R LE LAD  -AJ      Time 6 ~2-3 min  -AJ         Exercise 7    Exercise Name 7 HL B hip add  -AJ      Sets 7 1  -AJ      Reps 7 20  -AJ      Time 7 5\" hold  -AJ         Exercise 8    Exercise Name 8 HL B hip abd  -AJ      Sets 8 1  -AJ      Reps 8 20  -AJ      Time 8 5\" hold  -AJ      Additional Comments RTB  -AJ         Exercise 9    Exercise Name 9 Bridges  -AJ      Sets 9 2  -AJ      Reps 9 10  -AJ      Time 9 5\" hold  -AJ         Exercise 10    Exercise Name 10 LTR  -AJ      Reps 10 10  -AJ      Time 10 10\" hold  -AJ         " "Exercise 11    Exercise Name 11 DKTC Physioball Rolls  -      Time 11 5 sec hold repeating for 3 minutes  -         Exercise 12    Exercise Name 12 Prone Hamcurls  -      Reps 12 20  -MH      Time 12 5 sec ecc lower  -         Exercise 13    Exercise Name 13 Prone Alt LE extension  -      Reps 13 20  -MH      Time 13 5 sec hold  -         Exercise 14    Exercise Name 14 Prone Heel Squeezes  -      Reps 14 20  -MH      Time 14 5 sec hold  -         Exercise 15    Exercise Name 15 Prone Hip IR  -      Reps 15 20  -MH      Time 15 5 sec hold  -      Additional Comments YTB  -                User Key  (r) = Recorded By, (t) = Taken By, (c) = Cosigned By      Initials Name Provider Type    Jackelin Groves, PTA Physical Therapist Assistant    Jayashree Fox, PT DPT Physical Therapist                                  PT OP Goals       Row Name 08/22/23 0800          PT Short Term Goals    STG Date to Achieve 09/05/23  -     STG 1 Patient to be I with HEP with additions/changes prior to next recertification.  -     STG 1 Progress Ongoing  -     STG 2 Patient to demo proper log roll technique with no cues from PT.  -     STG 2 Progress Ongoing  -     STG 3 R LE strength >/= 4+/5.  -     STG 4 Trunk flexion AROM >/= 85ø.  -     STG 5 Patient to perform 20 bridges with UE \"X\" position with good form and no increase in pain.  -        Long Term Goals    LTG 1 I with final HEP and self management of s/s.  -     LTG 2 B LE strenght 5/5.  -     LTG 3 Patient to participate in ergonomics training with verbalization of understanding.  -     LTG 4 Patient to demo proper lifting mechanics with no increase in pain and no cues from PT.  -     LTG 5 Patient to have AROM for the lumbar spine all WNL, no increase in pain.  -     LTG 6 Patient able to perform 10 minutes of standing core stab activities with good PN and no increase in pain.  -     LTG 7 Patient able to perform 10 " minutes of seated DD core stab activities with good PN and no increase in pain.  -JAZMIN        Time Calculation    PT Goal Re-Cert Due Date 09/05/23  -JAZMIN               User Key  (r) = Recorded By, (t) = Taken By, (c) = Cosigned By      Initials Name Provider Type    Jackelin Groves PTA Physical Therapist Assistant    Jayashree Fox, PT DPT Physical Therapist                    Therapy Education  Education Details: bridges, hooklying hip add squeeze, prone heel squeezes  Given: HEP, Pain management, Symptoms/condition management, Other (comment)  Program: New, Reinforced  How Provided: Verbal, Demonstration, Written  Provided to: Patient  Level of Understanding: Verbalized, Demonstrated              Time Calculation:   Start Time: 0840  Stop Time: 0938  Time Calculation (min): 58 min  Total Timed Code Minutes- PT: 58 minute(s)  Therapy Charges for Today       Code Description Service Date Service Provider Modifiers Qty    77485530801 HC PT THER SUPP EA 15 MIN 8/22/2023 Jackelin Tsang PTA GP, CQ 1    53870584814 HC PT THER PROC EA 15 MIN 8/22/2023 Jackelin Tsang PTA GP, CQ 2              AJ x 1 therapeutic support    AJ x 2 therapeutic procedure        Jackelin Tsang PTA  8/22/2023      This document has been electronically signed by Jackelin Tsang PTA on August 22, 2023 10:16 CDT

## 2023-08-24 ENCOUNTER — APPOINTMENT (OUTPATIENT)
Dept: PHYSICAL THERAPY | Facility: HOSPITAL | Age: 48
End: 2023-08-24
Payer: MEDICARE

## 2023-08-29 ENCOUNTER — APPOINTMENT (OUTPATIENT)
Dept: PHYSICAL THERAPY | Facility: HOSPITAL | Age: 48
End: 2023-08-29
Payer: MEDICARE

## 2023-08-30 ENCOUNTER — HOSPITAL ENCOUNTER (OUTPATIENT)
Dept: PHYSICAL THERAPY | Facility: HOSPITAL | Age: 48
Setting detail: THERAPIES SERIES
Discharge: HOME | End: 2023-08-30
Payer: MEDICARE

## 2023-08-30 DIAGNOSIS — M79.604 PAIN OF RIGHT LOWER EXTREMITY: ICD-10-CM

## 2023-08-30 DIAGNOSIS — M54.50 LUMBAR BACK PAIN: Primary | ICD-10-CM

## 2023-08-30 PROCEDURE — 97140 MANUAL THERAPY 1/> REGIONS: CPT

## 2023-08-30 PROCEDURE — 97110 THERAPEUTIC EXERCISES: CPT

## 2023-08-30 NOTE — THERAPY TREATMENT NOTE
Outpatient Physical Therapy Ortho Treatment Note  [facility]     Patient Name: Kathy Betancourt  : 1975  MRN: [id number]  Today's Date: 2023    Pt seen for 4 PT sessions  Reported Improvement:  N/A %  MD Visit: 2023  Recheck Date: 2023    Therapy Diagnosis:  Chronic LBP/SI joint pain, unspecified           Visit Date: 2023    Visit Dx:    ICD-10-CM ICD-9-CM   1. Lumbar back pain  M54.50 724.2   2. Pain of right lower extremity  M79.604 729.5       There is no problem list on file for this patient.       Past Medical History:   Diagnosis Date    Arthritis     Asthma     Diabetes mellitus     Disease of thyroid gland     GERD (gastroesophageal reflux disease)     Low back pain     Sleep apnea         Past Surgical History:   Procedure Laterality Date    APPENDECTOMY      CHOLECYSTECTOMY      HERNIA REPAIR      LUMBAR FUSION  2021    SI joint - R side        PT Ortho       Row Name 23 0800       Subjective Comments    Subjective Comments Doing okay, just a little tingling in the R hip  -       Precautions and Contraindications    Precautions/Limitations no known precautions/limitations  -       Subjective Pain    Able to rate subjective pain? yes  -    Pre-Treatment Pain Level 3  -              User Key  (r) = Recorded By, (t) = Taken By, (c) = Cosigned By      Initials Name Provider Type    Kacie Dawkins PTA Physical Therapist Assistant                                 PT Assessment/Plan       Row Name 23 0900          PT Assessment    Assessment Comments Pt reports continued R hip and inguenal pain.  Pt with good recall of curretn HEP.  No changes in pain wiht manual work.  -        PT Plan    PT Frequency 2x/week  -     PT Plan Comments Next visit add seated DD core stab next visit.  -               User Key  (r) = Recorded By, (t) = Taken By, (c) = Cosigned By      Initials Name Provider Type    Kacie Dawkins PTA Physical Therapist Assistant       "                 OP Exercises       Row Name 08/30/23 0800             Subjective Comments    Subjective Comments Doing okay, just a little tingling in the R hip  -JW         Subjective Pain    Able to rate subjective pain? yes  -JW      Pre-Treatment Pain Level 3  -JW      Post-Treatment Pain Level 3  -JW      Subjective Pain Comment R hip  \"feel looser\"  -JW         Exercise 1    Exercise Name 1 Pro II LE bike for strength endurance and posturing.  -JW      Time 1 10 min  -JW      Additional Comments L 6.0  -JW         Exercise 2    Exercise Name 2 B St HS S  -JW      Reps 2 2  -JW      Time 2 30 seconds  -JW         Exercise 3    Exercise Name 3 B sitting piriformis S  -JW      Reps 3 2  -JW      Time 3 30 seconds  -JW         Exercise 4    Exercise Name 4 Sit to stand with lumbar ext.  -JW      Sets 4 2  -JW      Reps 4 10  -JW      Time 4 5\" hold in ext  -JW         Exercise 5    Exercise Name 5 Pball Roll outs 3 way  -JW      Time 5 3 min 5 sec hold ea postition  -JW         Exercise 6    Exercise Name 6 Alignment check  -JW      Sets 6 RLE shortened  -JW      Additional Comments ~2-3 min RLE LAD  -JW         Exercise 7    Exercise Name 7 HL B hip add  -JW      Sets 7 1  -JW      Reps 7 20  -JW      Time 7 5\" hold  -JW         Exercise 8    Exercise Name 8 HL B hip abd  -JW      Sets 8 1  -JW      Reps 8 20  -JW      Time 8 5\" hold  -JW      Additional Comments GTB  -JW         Exercise 9    Exercise Name 9 Bridges  -JW      Sets 9 2  -JW      Reps 9 10  -JW      Time 9 5\" hold  -JW         Exercise 10    Exercise Name 10 Prone R hip AP mobs  -JW                User Key  (r) = Recorded By, (t) = Taken By, (c) = Cosigned By      Initials Name Provider Type    Kacie Dawkins PTA Physical Therapist Assistant                             Manual Rx (last 36 hours)       Manual Treatments       Row Name 08/30/23 0900             Manual Rx 1    Manual Rx 1 Location R hip  -JW      Manual Rx 1 Type LAD  -JW      " "Manual Rx 1 Duration 4 x 30-45 sec  -         Manual Rx 2    Manual Rx 2 Location R hip  -      Manual Rx 2 Type prone AP mobs  -      Manual Rx 2 Duration 4 min  -                User Key  (r) = Recorded By, (t) = Taken By, (c) = Cosigned By      Initials Name Provider Type    ANGIE Kacie Pickering PTA Physical Therapist Assistant                     PT OP Goals       Row Name 08/30/23 0800          PT Short Term Goals    STG Date to Achieve 09/05/23  -     STG 1 Patient to be I with HEP with additions/changes prior to next recertification.  -     STG 1 Progress Met;Ongoing  -     STG 2 Patient to demo proper log roll technique with no cues from PT.  -     STG 2 Progress Ongoing  -     STG 3 R LE strength >/= 4+/5.  -     STG 3 Progress Ongoing  -     STG 4 Trunk flexion AROM >/= 85ø.  -     STG 4 Progress Ongoing  -     STG 5 Patient to perform 20 bridges with UE \"X\" position with good form and no increase in pain.  -     STG 5 Progress Progressing  -        Long Term Goals    LTG 1 I with final HEP and self management of s/s.  -     LTG 2 B LE strenght 5/5.  -     LTG 3 Patient to participate in ergonomics training with verbalization of understanding.  -     LTG 4 Patient to demo proper lifting mechanics with no increase in pain and no cues from PT.  -     LTG 5 Patient to have AROM for the lumbar spine all WNL, no increase in pain.  -     LTG 6 Patient able to perform 10 minutes of standing core stab activities with good PN and no increase in pain.  [facility] Patient able to perform 10 minutes of seated DD core stab activities with good PN and no increase in pain.  -        Time Calculation    PT Goal Re-Cert Due Date 09/05/23  -               User Key  (r) = Recorded By, (t) = Taken By, (c) = Cosigned By      Initials Name Provider Type    Kacie Dawkins PTA Physical Therapist Assistant                    Therapy Education  Given: HEP, Symptoms/condition " management, Pain management  Program: Reinforced  How Provided: Verbal, Demonstration  Provided to: Patient  Level of Understanding: Verbalized              Time Calculation:   Start Time: 0828  Stop Time: 0924  Time Calculation (min): 56 min  Therapy Charges for Today       Code Description Service Date Service Provider Modifiers Qty    44638599762 HC PT THER PROC EA 15 MIN 8/30/2023 Kacie Pickering PTA GP, CQ 3    80579807525 HC PT MANUAL THERAPY EA 15 MIN 8/30/2023 Kacie Pickering PTA GP, CQ 1    94548258680 HC PT THER SUPP EA 15 MIN 8/30/2023 Kacie Pickering PTA GP, CQ 1                      Kacie Pickering PTA  8/30/2023

## 2023-08-31 ENCOUNTER — HOSPITAL ENCOUNTER (OUTPATIENT)
Dept: PHYSICAL THERAPY | Facility: HOSPITAL | Age: 48
Setting detail: THERAPIES SERIES
Discharge: HOME | End: 2023-08-31
Payer: MEDICARE

## 2023-08-31 DIAGNOSIS — M54.50 LUMBAR BACK PAIN: Primary | ICD-10-CM

## 2023-08-31 DIAGNOSIS — M79.604 PAIN OF RIGHT LOWER EXTREMITY: ICD-10-CM

## 2023-08-31 PROCEDURE — 97110 THERAPEUTIC EXERCISES: CPT

## 2023-08-31 PROCEDURE — 97140 MANUAL THERAPY 1/> REGIONS: CPT

## 2023-08-31 NOTE — THERAPY TREATMENT NOTE
Outpatient Physical Therapy Ortho Treatment Note  [facility]     Patient Name: Kathy Betancourt  : 1975  MRN: [id number]  Today's Date: 2023      Pt seen for 5 PT sessions  Reported Improvement:  N/A %  MD Visit: 2023  Recheck Date: 2023     Therapy Diagnosis:  Chronic LBP/SI joint pain, unspecified                               Visit Date: 2023    Visit Dx:    ICD-10-CM ICD-9-CM   1. Lumbar back pain  M54.50 724.2   2. Pain of right lower extremity  M79.604 729.5       There is no problem list on file for this patient.       Past Medical History:   Diagnosis Date    Arthritis     Asthma     Diabetes mellitus     Disease of thyroid gland     GERD (gastroesophageal reflux disease)     Low back pain     Sleep apnea         Past Surgical History:   Procedure Laterality Date    APPENDECTOMY      CHOLECYSTECTOMY      HERNIA REPAIR      LUMBAR FUSION  2021    SI joint - R side                        PT Assessment/Plan       Row Name 23 0800          PT Assessment    Assessment Comments Myofascial restrictions noted to R TFL and R glute max this visit. MFR to tissues performed with no report of change in symptoms. patient only reports soreness at end of tx session. PT ed patient to use heat for pain relief. Pt verbalized understanding. Seated DD core stability activities performed with good patient tolerance and effort.  -AC        PT Plan    PT Frequency 2x/week  -AC     PT Plan Comments Recheck. Teach household ergonomics and lifting mechanics next visit.  -AC               User Key  (r) = Recorded By, (t) = Taken By, (c) = Cosigned By      Initials Name Provider Type    Madhavi Ventura PT Physical Therapist                       OP Exercises       Row Name 23 0800             Subjective Comments    Subjective Comments Feeling sore this AM. Reports pain still in anterior inguinal region of R hip.  -AC         Subjective Pain    Able to rate subjective pain? yes  -AC    "   Pre-Treatment Pain Level 3  -AC      Post-Treatment Pain Level --  \"sore\"  -AC      Subjective Pain Comment R hip  -AC         Exercise 1    Exercise Name 1 Pro II LE bike for strength endurance and posturing.  -AC      Time 1 10 min  -AC      Additional Comments L 6.0  -AC         Exercise 2    Exercise Name 2 B St HS S  -AC      Reps 2 2  -AC      Time 2 30 seconds  -AC         Exercise 3    Exercise Name 3 B sitting piriformis S  -AC      Reps 3 2  -AC      Time 3 30 seconds  -AC         Exercise 4    Exercise Name 4 Sit to stand with lumbar ext.  -AC      Sets 4 2  -AC      Reps 4 10  -AC      Time 4 5\" hold in ext  -AC         Exercise 5    Exercise Name 5 Seated DD LAQ  -AC      Time 5 3 min 5 sec hold ea postition  -AC         Exercise 6    Exercise Name 6 Seated DD alt march  -AC      Sets 6 1  -AC      Reps 6 20  -AC      Time 6 5\" hold  -AC         Exercise 7    Exercise Name 7 see manual  -AC         Exercise 8    Exercise Name 8 B SKTC  -AC      Reps 8 2  -AC      Time 8 30 sec hold  -AC                User Key  (r) = Recorded By, (t) = Taken By, (c) = Cosigned By      Initials Name Provider Type    AC Madhavi Olivo, PT Physical Therapist                             Manual Rx (last 36 hours)       Manual Treatments       Row Name 08/31/23 0900             Manual Rx 1    Manual Rx 1 Location R TFL  -AC      Manual Rx 1 Type MFR with deactivation  -AC         Manual Rx 2    Manual Rx 2 Location R glute max  -AC      Manual Rx 2 Type MFR with deactivation + reinforcement  -AC                User Key  (r) = Recorded By, (t) = Taken By, (c) = Cosigned By      Initials Name Provider Type    AC Madhavi Olivo PT Physical Therapist                     PT OP Goals       Row Name 08/31/23 0800          PT Short Term Goals    STG Date to Achieve 09/05/23  -AC     STG 1 Patient to be I with HEP with additions/changes prior to next recertification.  -AC     STG 1 Progress Met;Ongoing  -AC     STG 2 Patient " "to demo proper log roll technique with no cues from PT.  -     STG 2 Progress Ongoing  -     STG 3 R LE strength >/= 4+/5.  -     STG 3 Progress Ongoing  -     STG 4 Trunk flexion AROM >/= 85ø.  -     STG 4 Progress Ongoing  -     STG 5 Patient to perform 20 bridges with UE \"X\" position with good form and no increase in pain.  -     STG 5 Progress Progressing  -        Long Term Goals    LTG 1 I with final HEP and self management of s/s.  -     LTG 2 B LE strenght 5/5.  -     LTG 3 Patient to participate in ergonomics training with verbalization of understanding.  -     LTG 4 Patient to demo proper lifting mechanics with no increase in pain and no cues from PT.  -     LTG 5 Patient to have AROM for the lumbar spine all WNL, no increase in pain.  -     LTG 6 Patient able to perform 10 minutes of standing core stab activities with good PN and no increase in pain.  -     LTG 7 Patient able to perform 10 minutes of seated DD core stab activities with good PN and no increase in pain.  -        Time Calculation    PT Goal Re-Cert Due Date 09/05/23  -               User Key  (r) = Recorded By, (t) = Taken By, (c) = Cosigned By      Initials Name Provider Type     Madhavi Olivo PT Physical Therapist                    Therapy Education  Education Details: heat modalities  Given: Symptoms/condition management, Pain management  Program: Reinforced  How Provided: Verbal  Provided to: Patient  Level of Understanding: Verbalized              Time Calculation:   Start Time: 0832  Stop Time: 0917  Time Calculation (min): 45 min  Therapy Charges for Today       Code Description Service Date Service Provider Modifiers Qty    04952563338 HC PT THER SUPP EA 15 MIN 8/31/2023 [place] Madhavi, PT GP 1    99167806279 HC PT THER PROC EA 15 MIN 8/31/2023 [place] GP 2    60397878040 HC PT MANUAL THERAPY EA 15 MIN 8/31/2023 [place] PT GP 1                      Madhavi Olivo PT, " DPT  8/31/2023

## 2023-09-06 ENCOUNTER — HOSPITAL ENCOUNTER (OUTPATIENT)
Dept: PHYSICAL THERAPY | Facility: HOSPITAL | Age: 48
Setting detail: THERAPIES SERIES
Discharge: HOME | End: 2023-09-06
Payer: MEDICARE

## 2023-09-06 DIAGNOSIS — M54.50 LUMBAR BACK PAIN: Primary | ICD-10-CM

## 2023-09-06 DIAGNOSIS — M79.604 PAIN OF RIGHT LOWER EXTREMITY: ICD-10-CM

## 2023-09-06 PROCEDURE — 97110 THERAPEUTIC EXERCISES: CPT

## 2023-09-06 PROCEDURE — 97530 THERAPEUTIC ACTIVITIES: CPT

## 2023-09-07 ENCOUNTER — OFFICE VISIT (OUTPATIENT)
Dept: ENDOCRINOLOGY | Facility: CLINIC | Age: 48
End: 2023-09-07
Payer: MEDICARE

## 2023-09-07 ENCOUNTER — LAB (OUTPATIENT)
Dept: LAB | Facility: HOSPITAL | Age: 48
End: 2023-09-07
Payer: MEDICARE

## 2023-09-07 VITALS
HEART RATE: 111 BPM | HEIGHT: 64 IN | WEIGHT: 239.4 LBS | OXYGEN SATURATION: 98 % | SYSTOLIC BLOOD PRESSURE: 128 MMHG | BODY MASS INDEX: 40.87 KG/M2 | DIASTOLIC BLOOD PRESSURE: 100 MMHG

## 2023-09-07 DIAGNOSIS — E55.9 VITAMIN D DEFICIENCY: ICD-10-CM

## 2023-09-07 DIAGNOSIS — E89.0 POSTOPERATIVE HYPOTHYROIDISM: ICD-10-CM

## 2023-09-07 DIAGNOSIS — E11.65 TYPE 2 DIABETES MELLITUS WITH HYPERGLYCEMIA, WITHOUT LONG-TERM CURRENT USE OF INSULIN: Primary | ICD-10-CM

## 2023-09-07 LAB
ALBUMIN SERPL-MCNC: 4.5 G/DL (ref 3.5–5.2)
ALBUMIN/GLOB SERPL: 1.2 G/DL
ALP SERPL-CCNC: 123 U/L (ref 39–117)
ALT SERPL W P-5'-P-CCNC: 29 U/L (ref 1–33)
ANION GAP SERPL CALCULATED.3IONS-SCNC: 12 MMOL/L (ref 5–15)
AST SERPL-CCNC: 22 U/L (ref 1–32)
BASOPHILS # BLD AUTO: 0.07 10*3/MM3 (ref 0–0.2)
BASOPHILS NFR BLD AUTO: 0.6 % (ref 0–1.5)
BILIRUB SERPL-MCNC: <0.2 MG/DL (ref 0–1.2)
BUN SERPL-MCNC: 9 MG/DL (ref 6–20)
BUN/CREAT SERPL: 15 (ref 7–25)
CALCIUM SPEC-SCNC: 10.1 MG/DL (ref 8.6–10.5)
CHLORIDE SERPL-SCNC: 98 MMOL/L (ref 98–107)
CHOLEST SERPL-MCNC: 233 MG/DL (ref 0–200)
CO2 SERPL-SCNC: 27 MMOL/L (ref 22–29)
CREAT SERPL-MCNC: 0.6 MG/DL (ref 0.57–1)
DEPRECATED RDW RBC AUTO: 40.9 FL (ref 37–54)
EGFRCR SERPLBLD CKD-EPI 2021: 111.6 ML/MIN/1.73
EOSINOPHIL # BLD AUTO: 0.22 10*3/MM3 (ref 0–0.4)
EOSINOPHIL NFR BLD AUTO: 1.9 % (ref 0.3–6.2)
ERYTHROCYTE [DISTWIDTH] IN BLOOD BY AUTOMATED COUNT: 12.5 % (ref 12.3–15.4)
GLOBULIN UR ELPH-MCNC: 3.8 GM/DL
GLUCOSE SERPL-MCNC: 165 MG/DL (ref 65–99)
HCT VFR BLD AUTO: 46 % (ref 34–46.6)
HDLC SERPL-MCNC: 44 MG/DL (ref 40–60)
HGB BLD-MCNC: 15.4 G/DL (ref 12–15.9)
IMM GRANULOCYTES # BLD AUTO: 0.04 10*3/MM3 (ref 0–0.05)
IMM GRANULOCYTES NFR BLD AUTO: 0.3 % (ref 0–0.5)
LDLC SERPL CALC-MCNC: 144 MG/DL (ref 0–100)
LDLC/HDLC SERPL: 3.18 {RATIO}
LYMPHOCYTES # BLD AUTO: 5.16 10*3/MM3 (ref 0.7–3.1)
LYMPHOCYTES NFR BLD AUTO: 44.9 % (ref 19.6–45.3)
MCH RBC QN AUTO: 29.6 PG (ref 26.6–33)
MCHC RBC AUTO-ENTMCNC: 33.5 G/DL (ref 31.5–35.7)
MCV RBC AUTO: 88.5 FL (ref 79–97)
MONOCYTES # BLD AUTO: 0.76 10*3/MM3 (ref 0.1–0.9)
MONOCYTES NFR BLD AUTO: 6.6 % (ref 5–12)
NEUTROPHILS NFR BLD AUTO: 45.7 % (ref 42.7–76)
NEUTROPHILS NFR BLD AUTO: 5.23 10*3/MM3 (ref 1.7–7)
NRBC BLD AUTO-RTO: 0 /100 WBC (ref 0–0.2)
PLATELET # BLD AUTO: 315 10*3/MM3 (ref 140–450)
PMV BLD AUTO: 9.9 FL (ref 6–12)
POTASSIUM SERPL-SCNC: 4.5 MMOL/L (ref 3.5–5.2)
PROT SERPL-MCNC: 8.3 G/DL (ref 6–8.5)
RBC # BLD AUTO: 5.2 10*6/MM3 (ref 3.77–5.28)
SODIUM SERPL-SCNC: 137 MMOL/L (ref 136–145)
TRIGL SERPL-MCNC: 246 MG/DL (ref 0–150)
TSH SERPL DL<=0.05 MIU/L-ACNC: 0.9 UIU/ML (ref 0.27–4.2)
VLDLC SERPL-MCNC: 45 MG/DL (ref 5–40)
WBC NRBC COR # BLD: 11.48 10*3/MM3 (ref 3.4–10.8)

## 2023-09-07 PROCEDURE — 36415 COLL VENOUS BLD VENIPUNCTURE: CPT | Performed by: NURSE PRACTITIONER

## 2023-09-07 PROCEDURE — 82043 UR ALBUMIN QUANTITATIVE: CPT | Performed by: NURSE PRACTITIONER

## 2023-09-07 PROCEDURE — 82306 VITAMIN D 25 HYDROXY: CPT | Performed by: NURSE PRACTITIONER

## 2023-09-07 PROCEDURE — 84443 ASSAY THYROID STIM HORMONE: CPT | Performed by: NURSE PRACTITIONER

## 2023-09-07 PROCEDURE — 82607 VITAMIN B-12: CPT | Performed by: NURSE PRACTITIONER

## 2023-09-07 PROCEDURE — 83036 HEMOGLOBIN GLYCOSYLATED A1C: CPT | Performed by: NURSE PRACTITIONER

## 2023-09-07 PROCEDURE — 80053 COMPREHEN METABOLIC PANEL: CPT | Performed by: NURSE PRACTITIONER

## 2023-09-07 PROCEDURE — 85025 COMPLETE CBC W/AUTO DIFF WBC: CPT | Performed by: NURSE PRACTITIONER

## 2023-09-07 PROCEDURE — 80061 LIPID PANEL: CPT | Performed by: NURSE PRACTITIONER

## 2023-09-07 PROCEDURE — 82570 ASSAY OF URINE CREATININE: CPT | Performed by: NURSE PRACTITIONER

## 2023-09-07 RX ORDER — BLOOD-GLUCOSE SENSOR
1 EACH MISCELLANEOUS
Qty: 2 EACH | Refills: 11 | Status: SHIPPED | OUTPATIENT
Start: 2023-09-07

## 2023-09-07 RX ORDER — GLIMEPIRIDE 4 MG/1
4 TABLET ORAL 2 TIMES DAILY
COMMUNITY

## 2023-09-07 RX ORDER — SEMAGLUTIDE 0.68 MG/ML
0.5 INJECTION, SOLUTION SUBCUTANEOUS WEEKLY
Qty: 3 ML | Refills: 5 | Status: SHIPPED | OUTPATIENT
Start: 2023-09-07

## 2023-09-07 NOTE — PATIENT INSTRUCTIONS
Taking Trulicity 0.75 mg once  weekly -- change to Ozempic 0.25 mg once weekly for 4 weeks then 0.5 mg weekly      Start Monday to replace Trulicity     Farxiga 10 mg - not taking -- restart     Taking glimepiride 4 mg two daily --- decrease to one daily             Start novolog  before each TID based on blood glucose       151-200  2 units   201-250  4 units   251-300  6 units   Above 301   8 units

## 2023-09-07 NOTE — PROGRESS NOTES
"Chief Complaint  Thyroid Problem and Diabetes (fingerstick)    Subjective          Kathy Betancourt presents to [facility] ENDOCRINOLOGY  History of Present Illness        In office visit    Referring provider NAVEEN Rodrigues        Chief Complaint   Patient presents with    Thyroid Problem    Diabetes     fingerstick       HPI    48-year-old female presents for consultation    Reason hypothyroidism and diabetes mellitus type 2      Hypothyroidism     Timing constant    Quality not controlled    Severity moderate    Total thyroidectomy in 2016 due to MNG     Cytology benign     Taking brand name synthroid         Diabetes mellitus type 2    Diagnosed in 2021     Timing constant     Quality not controlled    Severity moderate     Complications -  neuropathy     Current diabetes regimen     GLP-1, SGLT-2,     Current glucose monitoring     Fingerstick's 4 times a day       Am readings 80 up to 100     Before bed --  120       No low       Current diet--- trying to do sugar free drinks       Review of Systems - General ROS: negative            Objective   Vital Signs:   /100   Pulse 111   Ht 162.6 cm (64\")   Wt 109 kg (239 lb 6.4 oz)   SpO2 98%   BMI 41.09 kg/m²     Physical Exam  Constitutional:       Appearance: Normal appearance.   Cardiovascular:      Rate and Rhythm: Regular rhythm.      Heart sounds: Normal heart sounds.   Musculoskeletal:      Cervical back: Normal range of motion.   Neurological:      Mental Status: She is alert.      Result Review :                 Assessment and Plan    Diagnoses and all orders for this visit:    1. Type 2 diabetes mellitus with hyperglycemia, without long-term current use of insulin (Primary)  -     CBC & Differential  -     Comprehensive Metabolic Panel  -     Hemoglobin A1c  -     TSH  -     Vitamin B12  -     Vitamin D,25-Hydroxy  -     Microalbumin / Creatinine Urine Ratio - Urine, Clean Catch  -     Lipid Panel    2. Postoperative " hypothyroidism  -     CBC & Differential  -     Comprehensive Metabolic Panel  -     Hemoglobin A1c  -     TSH  -     Vitamin B12  -     Vitamin D,25-Hydroxy  -     Microalbumin / Creatinine Urine Ratio - Urine, Clean Catch  -     Lipid Panel    3. Vitamin D deficiency  -     CBC & Differential  -     Comprehensive Metabolic Panel  -     Hemoglobin A1c  -     TSH  -     Vitamin B12  -     Vitamin D,25-Hydroxy  -     Microalbumin / Creatinine Urine Ratio - Urine, Clean Catch  -     Lipid Panel    Other orders  -     Semaglutide,0.25 or 0.5MG/DOS, (Ozempic, 0.25 or 0.5 MG/DOSE,) 2 MG/3ML solution pen-injector; Inject 0.5 mg under the skin into the appropriate area as directed 1 (One) Time Per Week.  Dispense: 3 mL; Refill: 5  -     Continuous Blood Gluc Sensor ([employer] Gordon 3 Sensor) misc; Use 1 each Every 14 (Fourteen) Days.  Dispense: 2 each; Refill: 11                Glycemic Management:    Diabetes mellitus type 2      Taking Trulicity 0.75 mg once  weekly -- change to Ozempic 0.25 mg once weekly for 4 weeks then 0.5 mg weekly      Start Monday to replace Trulicity     Farxiga 10 mg - not taking -- restart     Taking glimepiride 4 mg two daily --- decrease to one daily             Start novolog  before each TID based on blood glucose       151-200  2 units   201-250  4 units   251-300  6 units   Above 301   8 units           Metformin --cannot take due to side effects         Send in for gordon 3         Goals for sugar    Fasting and before meals 80 to 130    2 hours after meals 180 or less      Aim for 45 grams of carbohydrate per meal    Aim for 15 grams of carbohydrate per snack     Microvascular Complications Monitoring       Last eye exam- 2023, no DR     Neuropathy -yes       No renal disease         Lipid Management:     Not on statin       Blood Pressure Management:    Not on ACEi           Thyroid Health        Postoperative hypothyroidism       Taking brand name synthroid 200 mcg     Last tsh 20        Bone Health     Vitamin d def     Taking supplement             Preventive Care:     Former   Smoker       Labs today     Records from Dr Reyes received and reviewed from 2023   Thank you for this consultation       Follow Up   Return in about 6 weeks (around 10/19/2023) for Recheck, labs today, Video visit.  Patient was given instructions and counseling regarding her condition or for health maintenance advice. Please see specific information pulled into the AVS if appropriate.         This document has been electronically signed by NAVEEN Romano on September 7, 2023 10:48 CDT.

## 2023-09-08 ENCOUNTER — TELEPHONE (OUTPATIENT)
Dept: ENDOCRINOLOGY | Facility: CLINIC | Age: 48
End: 2023-09-08
Payer: MEDICARE

## 2023-09-08 LAB
25(OH)D3 SERPL-MCNC: 24.9 NG/ML (ref 30–100)
ALBUMIN UR-MCNC: <1.2 MG/DL
CREAT UR-MCNC: 85.9 MG/DL
HBA1C MFR BLD: 11.2 % (ref 4.8–5.6)
MICROALBUMIN/CREAT UR: NORMAL MG/G{CREAT}
VIT B12 BLD-MCNC: 644 PG/ML (ref 211–946)

## 2023-09-08 NOTE — TELEPHONE ENCOUNTER
----- Message from NAVEEN Rmoano sent at 9/8/2023 12:01 PM CDT -----  A1c was 11.2; we made changes yesterday; her vitamin d is mildly low at 24.9 be sure to take supplement ; urine negative for protein; b12 normal; thyroid level is normal; no change In dosage; liver and kidney function normal; her white blood cell count was high I do't know if she was sick or recent sickness but follow up with primary for elevated white count

## 2023-09-12 ENCOUNTER — HOSPITAL ENCOUNTER (OUTPATIENT)
Dept: PHYSICAL THERAPY | Facility: HOSPITAL | Age: 48
Setting detail: THERAPIES SERIES
Discharge: HOME | End: 2023-09-12
Payer: MEDICARE

## 2023-09-12 DIAGNOSIS — M54.50 LUMBAR BACK PAIN: Primary | ICD-10-CM

## 2023-09-12 DIAGNOSIS — M79.604 PAIN OF RIGHT LOWER EXTREMITY: ICD-10-CM

## 2023-09-12 PROCEDURE — 97535 SELF CARE MNGMENT TRAINING: CPT

## 2023-09-12 PROCEDURE — 97110 THERAPEUTIC EXERCISES: CPT

## 2023-09-12 NOTE — THERAPY DISCHARGE NOTE
Outpatient Physical Therapy Ortho Treatment Note/Discharge Summary  [facility]     Patient Name: Kathy Betancourt  : 1975  MRN: [id number]  Today's Date: 2023      Pt seen for 7 PT sessions  Reported Improvement:  75%  MD Visit: 2023  Recheck Date: N/A      Therapy Diagnosis:  Chronic LBP/SI joint pain, unspecified       Visit Date: 2023    Visit Dx:    ICD-10-CM ICD-9-CM   1. Lumbar back pain  M54.50 724.2   2. Pain of right lower extremity  M79.604 729.5       Patient Active Problem List   Diagnosis    Type 2 diabetes mellitus with hyperglycemia, without long-term current use of insulin    Postoperative hypothyroidism    Vitamin D deficiency        Past Medical History:   Diagnosis Date    Arthritis     Asthma     Diabetes mellitus     Disease of thyroid gland     GERD (gastroesophageal reflux disease)     Low back pain     Sleep apnea         Past Surgical History:   Procedure Laterality Date    APPENDECTOMY      CHOLECYSTECTOMY      HERNIA REPAIR      LUMBAR FUSION  2021    SI joint - R side        PT Ortho       Row Name 23 0800       Head/Neck/Trunk    Trunk Flexion AROM 85°  -AC       MMT (Manual Muscle Testing)    General MMT Comments R LE strength 4+/5.  -AC              User Key  (r) = Recorded By, (t) = Taken By, (c) = Cosigned By      Initials Name Provider Type    Madhavi Ventura, PT Physical Therapist                                 PT Assessment/Plan       Row Name 23 0800          PT Assessment    Assessment Comments Final HEP updated and remaining goals have been checked off. Patient also requests for DC this visit due to wanting futher imaging.  -AC     Patient would benefit from skilled therapy intervention No  -AC        PT Plan    PT Frequency 2x/week  -AC     Predicted Duration of Therapy Intervention (PT) discharge  -AC     PT Plan Comments discharge  -AC               User Key  (r) = Recorded By, (t) = Taken By, (c) = Cosigned By      Initials  "Name Provider Type    AC Madhavi Olivo PT Physical Therapist                         OP Exercises       Row Name 09/12/23 0800             Subjective    Patient Reason for Visit Hip Pain  -AC      Brief Description of Chief Complaint Pt reports her R hip is really bothering her. Only doing PT to get a MRI. Thinks it is bursitis in her R hip. Can't sleep or sit on R side.  -AC         Subjective Pain    Able to rate subjective pain? yes  -AC      Pre-Treatment Pain Level 9  -AC      Subjective Pain Comment R hip  -AC         Exercise 1    Exercise Name 1 Pro II LE bike for strength endurance and posturing.  -AC      Time 1 10 min  -AC      Additional Comments L 7.0  -AC         Exercise 2    Exercise Name 2 B St HS S  -AC      Reps 2 2  -AC      Time 2 30 seconds  -AC         Exercise 3    Exercise Name 3 B sitting piriformis S  -AC      Reps 3 2  -AC      Time 3 30 seconds  -AC         Exercise 4    Exercise Name 4 Seated DD LAQ  -AC      Sets 4 1  -AC      Reps 4 20  -AC      Time 4 5\" hold  -AC         Exercise 5    Exercise Name 5 Seated DD alt march  -AC      Sets 5 1  -AC      Reps 5 20  -AC      Time 5 5\" hold  -AC         Exercise 6    Exercise Name 6 prone HS curls  -AC      Sets 6 1  -AC      Reps 6 10  -AC      Additional Comments review for HEP  -AC         Exercise 7    Exercise Name 7 LTR  -AC      Reps 7 10  -AC      Time 7 10\" hold  -AC         Exercise 8    Exercise Name 8 Prone Alt LE extension  -AC      Sets 8 1  -AC      Reps 8 10  -AC      Time 8 5\" hold  -AC         Exercise 9    Exercise Name 9 Supine modified hip flexor S  -AC      Reps 9 1  -AC      Time 9 1 minute hold ea side  -AC         Exercise 10    Exercise Name 10 Pt education  -AC      Additional Comments see flowsheet  -AC                User Key  (r) = Recorded By, (t) = Taken By, (c) = Cosigned By      Initials Name Provider Type    AC Madhavi Olivo PT Physical Therapist                                    PT OP Goals       " "Row Name 09/12/23 0800          PT Short Term Goals    STG Date to Achieve 09/05/23  -AC     STG 1 Patient to be I with HEP with additions/changes prior to next recertification.  -AC     STG 1 Progress Met;Ongoing  -AC     STG 2 Patient to demo proper log roll technique with no cues from PT.  -AC     STG 2 Progress Met;Ongoing  -AC     STG 3 R LE strength >/= 4+/5.  -AC     STG 3 Progress Met  -AC     STG 4 Trunk flexion AROM >/= 85°.  -AC     STG 4 Progress Met  -AC     STG 5 Patient to perform 20 bridges with UE \"X\" position with good form and no increase in pain.  -AC     STG 5 Progress Met;Ongoing  -AC        Long Term Goals    LTG 1 I with final HEP and self management of s/s.  -AC     LTG 1 Progress Met  -AC     LTG 2 B LE strenght 5/5.  -AC     LTG 2 Progress Partially Met  -AC     LTG 2 Progress Comments R LE 4+/5; L LE 5/5.  -AC     LTG 3 Patient to participate in ergonomics training with verbalization of understanding.  -AC     LTG 3 Progress Met  -AC     LTG 4 Patient to demo proper lifting mechanics with no increase in pain and no cues from PT.  -AC     LTG 4 Progress Met  -AC     LTG 5 Patient to have AROM for the lumbar spine all WNL, no increase in pain.  -AC     LTG 5 Progress Partially Met  -AC     LTG 6 Patient able to perform 10 minutes of standing core stab activities with good PN and no increase in pain.  -AC     LTG 6 Progress Not Met  -AC     LTG 7 Patient able to perform 10 minutes of seated DD core stab activities with good PN and no increase in pain.  -     LTG 7 Progress Met  -        Time Calculation    PT Goal Re-Cert Due Date --  discharge  -               User Key  (r) = Recorded By, (t) = Taken By, (c) = Cosigned By      Initials Name Provider Type    Madhavi Ventura PT Physical Therapist                    Therapy Education  Education Details: final HEP  Given: HEP, Other (comment), Symptoms/condition management, Posture/body mechanics (discharge)  Program: New, Modified, " Progressed  How Provided: Verbal, Demonstration, Written  Provided to: Patient  Level of Understanding: Verbalized, Demonstrated              Time Calculation:   Start Time: 0833  Stop Time: 0915  Time Calculation (min): 42 min  Therapy Charges for Today       Code Description Service Date Service Provider Modifiers Qty    56355771441 HC PT THER SUPP EA 15 MIN 9/12/2023 Madhavi Olivo, PT GP 1    41004649731 HC PT SELF CARE/MGMT/TRAIN EA 15 MIN 9/12/2023 Madhavi Olivo PT GP 1    [id number]  PT THER PROC EA 15 MIN 9/12/2023 Madhavi Olivo, PT GP 2                  OP PT Discharge Summary  Date of Discharge: 09/12/23  Reason for Discharge: Lack of progress, Maximum functional potential achieved, Patient/Caregiver request  Outcomes Achieved: Patient able to partially acheive established goals, Refer to plan of care for updates on goals achieved  Discharge Destination: Home with home program      Madhavi Olivo PT,. DPT  9/12/2023

## 2023-09-14 ENCOUNTER — APPOINTMENT (OUTPATIENT)
Dept: PHYSICAL THERAPY | Facility: HOSPITAL | Age: 48
End: 2023-09-14
Payer: MEDICARE

## 2023-09-19 ENCOUNTER — TELEPHONE (OUTPATIENT)
Dept: NEUROSURGERY | Facility: CLINIC | Age: 48
End: 2023-09-19
Payer: MEDICARE

## 2023-10-05 ENCOUNTER — OFFICE VISIT (OUTPATIENT)
Dept: NEUROSURGERY | Facility: CLINIC | Age: 48
End: 2023-10-05
Payer: MEDICARE

## 2023-10-05 VITALS — BODY MASS INDEX: 40.8 KG/M2 | HEIGHT: 64 IN | WEIGHT: 239 LBS

## 2023-10-05 DIAGNOSIS — M54.50 LUMBAR BACK PAIN: Primary | ICD-10-CM

## 2023-10-05 DIAGNOSIS — R20.2 NUMBNESS AND TINGLING OF BOTH LOWER EXTREMITIES: ICD-10-CM

## 2023-10-05 DIAGNOSIS — R20.0 NUMBNESS AND TINGLING OF BOTH LOWER EXTREMITIES: ICD-10-CM

## 2023-10-05 DIAGNOSIS — M79.604 BILATERAL LEG PAIN: ICD-10-CM

## 2023-10-05 DIAGNOSIS — M79.605 BILATERAL LEG PAIN: ICD-10-CM

## 2023-10-05 DIAGNOSIS — E66.01 CLASS 3 SEVERE OBESITY DUE TO EXCESS CALORIES WITH SERIOUS COMORBIDITY AND BODY MASS INDEX (BMI) OF 40.0 TO 44.9 IN ADULT: ICD-10-CM

## 2023-10-05 PROCEDURE — 99213 OFFICE O/P EST LOW 20 MIN: CPT | Performed by: NURSE PRACTITIONER

## 2023-10-05 PROCEDURE — 1159F MED LIST DOCD IN RCRD: CPT | Performed by: NURSE PRACTITIONER

## 2023-10-05 PROCEDURE — 1160F RVW MEDS BY RX/DR IN RCRD: CPT | Performed by: NURSE PRACTITIONER

## 2023-10-05 NOTE — PROGRESS NOTES
Chief complaint:   Chief Complaint   Patient presents with    Back Pain     Patient here for follow up after completing PT. She did 6 visits at [facility].     Subjective     HPI:   Last encounter: 8/3/2023    Interval History: Kathy Betancourt is a 48 y.o.  female who presents today for follow-up of lumbar back pain and bilateral leg pain and dysesthesias, 60% back, 40% legs..  Ms. Betancourt recently completed 6 sessions of physical therapy with no significant relief in her discomfort.  She continues to complain of constant lumbar back pain that waxes and wanes in severity.  Her discomfort radiates to the bilateral hips, extending to the groin, and down the medial aspect of the thighs to the knees, as well as numbness and tingling in the same distribution that extends down the medial leg to the foot, right > left.  Her discomfort worsens with prolonged lying down, sitting, standing, walking, and with physical activity.  Alleviating factors include position change and use of pain medications.  Tentatively scheduled for evaluation by pain management centers of [facility] out of [place] in the near future.  She denies need for assist while ambulating or falls.  She continues to deny saddle anesthesia or bowel or bladder dysfunction.  She currently rates the severity of her symptoms 8/10.  No additional concerns at this time.    Oswestry Disability Index = 57.99%   Score   Pain Intensity Fairly severe pain-3   Personal Care I can look after myself but it is slow and painful-2   Lifting Medium weights off a table-3   Walking Pain prevents > 1/4 mile-2   Sitting Pain prevents sitting > 30 min-3   Standing Pain limits standing to < 1/2 hr-3   Sleeping Can only sleep < 2 hrs-4   Sex Life (if applicable) Sex severely restricted by pain-4   Social Life I do not go out as often because of pain-3   Traveling Pain restricts to < 1 hr-3   ([place] et al, 1980)    SCORE INTERPRETATION OF THE OSWESTRY LBP  DISABILITY QUESTIONNAIRE     40-60% Severe disability Pain remains the main problem in this group of patients, but travel, personal care, social life, sexual activity, and sleep are also affected.  These patients require detailed investigation.     ROS  Review of Systems   Constitutional: Negative.    HENT: Negative.     Eyes: Negative.    Respiratory: Negative.     Cardiovascular: Negative.    Gastrointestinal: Negative.    Endocrine: Negative.    Genitourinary: Negative.    Musculoskeletal: Negative.  Positive for back pain.   Skin: Negative.    Allergic/Immunologic: Negative.    Neurological: Negative.  Positive for numbness.   Hematological: Negative.    Psychiatric/Behavioral: Negative.     All other systems reviewed and are negative.    PFSH:  Past Medical History:   Diagnosis Date    Arthritis     Asthma     Diabetes mellitus     Disease of thyroid gland     GERD (gastroesophageal reflux disease)     Low back pain     Sleep apnea      Past Surgical History:   Procedure Laterality Date    APPENDECTOMY      CHOLECYSTECTOMY      HERNIA REPAIR      LUMBAR FUSION  06/2021    SI joint - R side     Objective      Current Outpatient Medications   Medication Sig Dispense Refill    allopurinol (ZYLOPRIM) 100 MG tablet Every 12 (Twelve) Hours.      Cholecalciferol 50 MCG (2000 UT) tablet Daily.      Continuous Blood Gluc Sensor ([employer] Gordon 3 Sensor) [facility] Use 1 each Every 14 (Fourteen) Days. 2 each 11    cyclobenzaprine (FLEXERIL) 10 MG tablet 1 tablet at bedtime as needed Orally as needed      dapagliflozin Propanediol (Farxiga) 10 MG tablet Daily. (Patient not taking: Reported on 9/7/2023)      glimepiride (AMARYL) 4 MG tablet Take 1 tablet by mouth 2 (Two) Times a Day.      Semaglutide,0.25 or 0.5MG/DOS, (Ozempic, 0.25 or 0.5 MG/DOSE,) 2 MG/3ML solution pen-injector Inject 0.5 mg under the skin into the appropriate area as directed 1 (One) Time Per Week. 3 mL 5    Synthroid 200 MCG tablet Daily.       No current  "facility-administered medications for this visit.     Vital Signs  Ht 162.6 cm (64\")   Wt 108 kg (239 lb)   BMI 41.02 kg/m²   Physical Exam  Vitals and nursing note reviewed.   Constitutional:       General: She is not in acute distress.     Appearance: Normal appearance. She is well-developed and well-groomed. She is morbidly obese. She is not ill-appearing, toxic-appearing or diaphoretic.      Comments: BMI 41.02   HENT:      Head: Normocephalic and atraumatic.      Right Ear: Hearing normal.      Left Ear: Hearing normal.   Eyes:      Extraocular Movements: EOM normal.      Conjunctiva/sclera: Conjunctivae normal.      Pupils: Pupils are equal, round, and reactive to light.   Neck:      Trachea: Trachea normal.   Cardiovascular:      Rate and Rhythm: Normal rate and regular rhythm.   Pulmonary:      Effort: Pulmonary effort is normal. No tachypnea, bradypnea, accessory muscle usage or respiratory distress.   Abdominal:      Palpations: Abdomen is soft.   Musculoskeletal:      Cervical back: Full passive range of motion without pain and neck supple.   Skin:     General: Skin is warm and dry.   Neurological:      Mental Status: She is alert and oriented to person, place, and time.      GCS: GCS eye subscore is 4. GCS verbal subscore is 5. GCS motor subscore is 6.      Deep Tendon Reflexes:      Reflex Scores:       Tricep reflexes are 1+ on the right side and 1+ on the left side.       Bicep reflexes are 1+ on the right side and 1+ on the left side.       Brachioradialis reflexes are 1+ on the right side and 1+ on the left side.       Patellar reflexes are 1+ on the right side and 1+ on the left side.       Achilles reflexes are 0 on the right side and 0 on the left side.  Psychiatric:         Speech: Speech normal.         Behavior: Behavior normal. Behavior is cooperative.     Neurologic Exam     Mental Status   Oriented to person, place, and time.   Attention: normal. Concentration: normal.   Speech: speech is " normal   Level of consciousness: alert    Cranial Nerves     CN II   Visual fields full to confrontation.     CN III, IV, VI   Pupils are equal, round, and reactive to light.  Extraocular motions are normal.     CN V   Facial sensation intact.     CN VII   Facial expression full, symmetric.     CN VIII   CN VIII normal.     CN IX, X   CN IX normal.     CN XI   CN XI normal.     Motor Exam   Right arm tone: normal  Left arm tone: normal  Right leg tone: normal  Left leg tone: normal    Strength   Right deltoid: 5/5  Left deltoid: 5/5  Right biceps: 5/5  Left biceps: 5/5  Right triceps: 5/5  Left triceps: 5/5  Right wrist extension: 5/5  Left wrist extension: 5/5  Right iliopsoas: 5/5  Left iliopsoas: 5/5  Right quadriceps: 5/5  Left quadriceps: 5/5  Right anterior tibial: 5/5  Left anterior tibial: 5/5  Right gastroc: 5/5  Left gastroc: 5/5  Right EHL 5/5  Left EHL 5/5       Sensory Exam   Right arm light touch: normal  Left arm light touch: normal  Right leg light touch: normal  Left leg light touch: normal    Gait, Coordination, and Reflexes     Tremor   Resting tremor: absent  Intention tremor: absent  Action tremor: absent    Reflexes   Right brachioradialis: 1+  Left brachioradialis: 1+  Right biceps: 1+  Left biceps: 1+  Right triceps: 1+  Left triceps: 1+  Right patellar: 1+  Left patellar: 1+  Right achilles: 0  Left achilles: 0  Right plantar: normal  Left plantar: normal  Right Osullivan: absent  Left Osullivan: absent  Right ankle clonus: absent  Left ankle clonus: absent  Right pendular knee jerk: absent  Left pendular knee jerk: absent  Antalgic gait   (12 bullet pts)    Results Review:   6/6/2023      Assessment/Plan:   Kathy Betancourt is a 48 y.o. female with a significant medical history of right SI joint fusion (2021), vitamin D deficiency, hypothyroidism, diabetes, tobacco abuse, and morbid obesity.  She presents with a known problem of axial low back pain and bilateral medial thigh pain, numbness, and  tingling 60 % back, 40% legs.  CHELSEA: 68,46,57.99.  Physical exam findings of gross hyporeflexia and an antalgic gait.   Her imaging shows prior right SI joint fusion, multilevel degenerative changes without signs of instability with flexion or extension.     Recommendations:  Lumbar back pain  Bilateral SI joint pain, right greater than left  History of right SI joint fusion (2021)  Right medial thigh pain and dysesthesias  Differential diagnosis include, but are not limited to spondylolisthesis with concern for mechanical instability, degenerative facet arthropathy, Lumbar stenosis with right lower extremity radiculopathy, spondyloarthropathies including ankylosis spondylitis (HLA-B27) or Paget's disease  , fibromyalgia or other rheumatological disease, malingering, conversion disorder, and secondary gain are diagnoses of exclusion, or failure of right SI joint fusion .      Ms. Betancourt recently completed a dedicated course of physician directed physical therapy with no significant relief in her discomfort.  She continues to complain of constant lumbar back pain and bilateral medial leg pain and dysesthesias.  For further evaluation we will send her for a noncontrasted MRI of the lumbar spine to rule out lumbar stenosis and need for surgical intervention, as well as an EMG and nerve conduction studies of bilateral lower extremities to confirm or refute radiculopathy from the lumbar spine and/or a peripheral neuropathy as a causative factor for lower extremity dysesthesias.  I would like Ms. Betancourt to return for reassessment Dr. Winn after all studies have been completed.  May call or return sooner for any new or additional concerns and agrees with this plan of care.Ms. Betancourt     Class 3 obesity (BMI >= 40) due to excessive calories with serious comorbidities BMI of 40.0-44.9 in adult  Body mass index is 41.93 kg/m². Information on the DASH diet provided in the AVS.  We will continue to provided diet and exercise  information with the goal of weight loss at each scheduled appointment.     Diagnoses and all orders for this visit:    1. Lumbar back pain (Primary)  -     MRI Lumbar Spine Without Contrast; Future  -     EMG & Nerve Conduction Test; Future    2. Bilateral leg pain  -     MRI Lumbar Spine Without Contrast; Future  -     EMG & Nerve Conduction Test; Future    3. Numbness and tingling of both lower extremities  -     MRI Lumbar Spine Without Contrast; Future  -     EMG & Nerve Conduction Test; Future    4. Class 3 severe obesity due to excess calories with serious comorbidity and body mass index (BMI) of 40.0 to 44.9 in adult      Return for next available with jaqueline.    Thank you, for allowing me to continue to participate in the care of this patient.    Sincerely,  NAVEEN Marsh

## 2023-11-13 ENCOUNTER — TELEPHONE (OUTPATIENT)
Dept: NEUROLOGY | Facility: HOSPITAL | Age: 48
End: 2023-11-13

## 2023-11-14 ENCOUNTER — HOSPITAL ENCOUNTER (OUTPATIENT)
Dept: NEUROLOGY | Facility: HOSPITAL | Age: 48
Discharge: HOME | End: 2023-11-14
Payer: MEDICARE

## 2023-11-14 ENCOUNTER — HOSPITAL ENCOUNTER (OUTPATIENT)
Dept: MRI IMAGING | Facility: HOSPITAL | Age: 48
Discharge: HOME | End: 2023-11-14
Payer: MEDICARE

## 2023-11-14 DIAGNOSIS — M54.50 LUMBAR BACK PAIN: ICD-10-CM

## 2023-11-14 DIAGNOSIS — M79.604 BILATERAL LEG PAIN: ICD-10-CM

## 2023-11-14 DIAGNOSIS — R20.0 NUMBNESS AND TINGLING OF BOTH LOWER EXTREMITIES: ICD-10-CM

## 2023-11-14 DIAGNOSIS — R20.2 NUMBNESS AND TINGLING OF BOTH LOWER EXTREMITIES: ICD-10-CM

## 2023-11-14 DIAGNOSIS — M79.605 BILATERAL LEG PAIN: ICD-10-CM

## 2023-11-14 PROCEDURE — 95911 NRV CNDJ TEST 9-10 STUDIES: CPT

## 2023-11-14 PROCEDURE — 95886 MUSC TEST DONE W/N TEST COMP: CPT

## 2023-11-14 PROCEDURE — 72148 MRI LUMBAR SPINE W/O DYE: CPT

## 2023-11-22 ENCOUNTER — DOCUMENTATION (OUTPATIENT)
Dept: NEUROSURGERY | Facility: CLINIC | Age: 48
End: 2023-11-22
Payer: MEDICARE

## 2025-04-10 NOTE — THERAPY TREATMENT NOTE
Outpatient Physical Therapy Ortho Progress Note  [facility]     Patient Name: Kathy Betancourt  : 1975  MRN: [id number]  Today's Date: 2023      Pt seen for 6 PT sessions  Reported Improvement:  75%  MD Visit: 2023  Recheck Date: 2023     Therapy Diagnosis:  Chronic LBP/SI joint pain, unspecified       Visit Date: 2023    Visit Dx:    ICD-10-CM ICD-9-CM   1. Lumbar back pain  M54.50 724.2   2. Pain of right lower extremity  M79.604 729.5       There is no problem list on file for this patient.       Past Medical History:   Diagnosis Date    Arthritis     Asthma     Diabetes mellitus     Disease of thyroid gland     GERD (gastroesophageal reflux disease)     Low back pain     Sleep apnea         Past Surgical History:   Procedure Laterality Date    APPENDECTOMY      CHOLECYSTECTOMY      HERNIA REPAIR      LUMBAR FUSION  2021    SI joint - R side        PT Ortho       Row Name 23 0900       Precautions and Contraindications    Precautions/Limitations no known precautions/limitations  -AC       Posture/Observations    Thoracic Kyphosis Bilateral:;Mild;Increased;Sitting posture  -AC    Rounded Shoulders Bilateral:;Mild;Increased;Sitting posture  -AC    Scoliosis Bilateral:;Normal  -AC    Lumbar lordosis Bilateral:;Moderate;Increased;Sitting posture;Standing posture  -AC    Iliac crests Bilateral:;Normal  pelvis level, no LLD  -AC    Posture/Observations Comments Fair postural awareness.  -AC       DTR- Lower Quarter Clearing    Patellar tendon (L2-4) Bilateral:;2- Normal response  -AC    Achilles tendon (S1-2) Bilateral:;2- Normal response  -AC       Sensory Screen for Light Touch- Lower Quarter Clearing    L1 (inguinal area) Bilateral:;Intact  -AC    L2 (anterior mid thigh) Bilateral:;Intact  -AC    L3 (distal anterior thigh) Bilateral:;Intact  -AC    L4 (medial lower leg/foot) Bilateral:;Intact  -AC    L5 (lateral lower leg/great toe) Bilateral:;Intact  -AC    S1 (bottom of  foot) Bilateral:;Intact  -AC       Lumbosacral Accessory Motions    PA Shelby- L3 Center:;Hypomobile  -AC    PA Shelby- L4 Center:;Hypomobile  -AC    PA Shelby- L5 Center:;Hypomobile  -AC    PA glide- Sacral base Center:;Hypomobile;Right pain  -AC    PA glide- Sacral apex Center:;WNL;Right pain  -AC    Innominate rotation Center:;WNL  -AC       Lumbar/SI Special Tests    SLR (Neural Tension) Bilateral:;Negative  -AC    SI Compression Test (SI Dysfunction) Right:;Positive  -AC    SI Distraction Test (SI Dysfunction) Right:;Positive  -AC    SHERRY (hip vs. SI Dysfunction) Left:;Positive  -AC       Lumbosacral Palpation    Spinous Process Bilateral:;Tender  L3-L5  -AC    Piriformis Bilateral:;Tender  -AC    Gluteus Madi Right:;Tender  -AC    Quadratus Lumborum Right:;Tender  -AC    Lumbosacral Palpation Comments TTP and tightness of R glute med. Misalignment of sacrum noted this visit with R on L sacral torsion noted. Improved after MT to R sacral MIGUELINA.  -AC       Beena's Signs    Superficial and non-anatomical tenderness Negative  -AC    Simulation test Negative  -AC    Distraction straight leg raise test (sitting vs supine) Negative  -AC    Regional disturbances Negative  -AC    Overreaction to examination Negative  -AC       General ROM    GENERAL ROM COMMENTS B LE AROM WFL.  -AC       Head/Neck/Trunk    Trunk Extension AROM 40°  -AC    Trunk Flexion AROM 80°, fingertip to B medial malleoli  -AC    Trunk Lt Lateral Flexion AROM 100% of full range  -AC    Trunk Rt Lateral Flexion AROM 100% of full range  -AC    Trunk Lt Rotation AROM 75% of full range  -AC    Trunk Rt Rotation AROM 75% of full range  -AC       MMT (Manual Muscle Testing)    General MMT Comments B LE strength 5/5 except R knee ext 4+/5, R hip IR/ER 4/5.  -AC       Sensation    Sensation WNL? WNL  -AC    Light Touch No apparent deficits  -AC       Lower Extremity Flexibility    Hamstrings Bilateral:;Mildly limited  -AC    Hip Flexors Bilateral:;Mildly  limited  -AC    Hip External Rotators Right:;Mildly limited  -AC    Hip Internal Rotators Right:;Mildly limited  -AC    Quadratus Lumborum Bilateral:;WNL  -AC       Pathomechanics    Spine Pathomechanics Hinges into extension at one segment in lumbar  -AC       Transfers    Comment, (Transfers) Improved log roll t/f with sup <> sit.  -AC       Gait/Stairs (Locomotion)    Comment, (Gait/Stairs) I with ambulation with no AD. Slight antaglic gait noted on R side. Increased waddling gait with excessive hip ER noted.  -AC              User Key  (r) = Recorded By, (t) = Taken By, (c) = Cosigned By      Initials Name Provider Type     Madhavi Olivo, PT Physical Therapist                                 PT Assessment/Plan       Row Name 09/06/23 0900          PT Assessment    Functional Limitations Impaired gait;Performance in leisure activities;Limitations in community activities;Limitation in home management  -AC     Impairments Balance;Gait;Impaired muscle endurance;Impaired muscle power;Impaired flexibility;Muscle strength;Pain;Poor body mechanics;Posture;Range of motion;Joint mobility  -AC     Assessment Comments Patient's R LE strength and trunk AROM has improved since initial eval. Patient still presents with fair posture, decreased dynamic pelvis/trunk stability, and TTP of surrounding R hip musculature. Continue to progress per patient tolerance and skilled PT to meet remaining goals.  -AC     Please refer to paper survey for additional self-reported information Yes  -AC     Rehab Potential Fair  -AC     Patient/caregiver participated in establishment of treatment plan and goals Yes  -AC     Patient would benefit from skilled therapy intervention No  -AC        PT Plan    PT Frequency 2x/week  -AC     Predicted Duration of Therapy Intervention (PT) 2-3 more visits  -AC     PT Plan Comments Begin finalizing final HEP. Continue to check off remaining goals. Resume seated DD core activities and prone LE  "activities. Update HEP.  -AC               User Key  (r) = Recorded By, (t) = Taken By, (c) = Cosigned By      Initials Name Provider Type    AC Madhavi Olivo, PT Physical Therapist                       OP Exercises       Row Name 09/06/23 0900             Subjective Comments    Subjective Comments Patient reports the exercises seem to help manage her pain. Reports 75% improvement since initial eval. Reports she thinks she is improveing. Slept wrong and is tight this AM.  -AC         Subjective Pain    Able to rate subjective pain? yes  -AC      Pre-Treatment Pain Level 5  -AC      Post-Treatment Pain Level 5  -AC      Subjective Pain Comment L side of low back  -AC         Exercise 1    Exercise Name 1 Pro II LE bike for strength endurance and posturing.  -AC      Time 1 10 min  -AC      Additional Comments L 6.5  -AC         Exercise 2    Exercise Name 2 B St HS S  -AC      Reps 2 2  -AC      Time 2 30 seconds  -AC         Exercise 3    Exercise Name 3 B sitting piriformis S  -AC      Reps 3 2  -AC      Time 3 30 seconds  -AC         Exercise 4    Exercise Name 4 Sit to stand with lumbar ext.  -AC      Sets 4 1  -AC      Reps 4 10  -AC      Time 4 5\" hold in ext  -AC         Exercise 5    Exercise Name 5 Recheck  -AC      Additional Comments see flowsheet  -AC         Exercise 6    Exercise Name 6 Bridges  -AC      Sets 6 1  -AC      Reps 6 20  -AC      Time 6 5\" hold  -AC      Additional Comments B UE \"x\" piosition  -AC         Exercise 7    Exercise Name 7 prone HS curls  -AC      Sets 7 1  -AC      Reps 7 20  -AC      Time 7 5 sec ecc lowering  -AC         Exercise 8    Exercise Name 8 Prone Alt LE extension  -AC      Sets 8 1  -AC      Reps 8 10 ea side  -AC                User Key  (r) = Recorded By, (t) = Taken By, (c) = Cosigned By      Initials Name Provider Type    AC Madhavi Olivo, PT Physical Therapist                                  PT OP Goals       Row Name 09/06/23 0900          PT Short " "Term Goals    STG Date to Achieve 09/05/23  -AC     STG 1 Patient to be I with HEP with additions/changes prior to next recertification.  -AC     STG 1 Progress Met;Ongoing  -AC     STG 2 Patient to demo proper log roll technique with no cues from PT.  -AC     STG 2 Progress Met;Ongoing  -AC     STG 3 R LE strength >/= 4+/5.  -AC     STG 3 Progress Partially Met;Ongoing  -AC     STG 4 Trunk flexion AROM >/= 85°.  -AC     STG 4 Progress Ongoing;Progressing  -AC     STG 4 Progress Comments trunk flexion 80°.  -AC     STG 5 Patient to perform 20 bridges with UE \"X\" position with good form and no increase in pain.  -AC     STG 5 Progress Met;Ongoing  -        Long Term Goals    LTG 1 I with final HEP and self management of s/s.  -     LTG 2 B LE strenght 5/5.  -AC     LTG 3 Patient to participate in ergonomics training with verbalization of understanding.  -AC     LTG 3 Progress Met  -     LTG 4 Patient to demo proper lifting mechanics with no increase in pain and no cues from PT.  -     LTG 4 Progress Met  -AC     LTG 5 Patient to have AROM for the lumbar spine all WNL, no increase in pain.  -AC     LTG 5 Progress Ongoing;Progressing  -AC     LTG 6 Patient able to perform 10 minutes of standing core stab activities with good PN and no increase in pain.  -     LTG 7 Patient able to perform 10 minutes of seated DD core stab activities with good PN and no increase in pain.  -               User Key  (r) = Recorded By, (t) = Taken By, (c) = Cosigned By      Initials Name Provider Type    Madhavi Ventura PT Physical Therapist                    Therapy Education  Education Details: lifting mechanics and house hold ergonomics  Given: HEP, Other (comment), Symptoms/condition management, Posture/body mechanics (updated POC)  Program: Reinforced  How Provided: Verbal  Provided to: Patient  Level of Understanding: Verbalized    Outcome Measure Options: Lower Extremity Functional Scale (LEFS), Modified " Oswestry  Lower Extremity Functional Index  Any of your usual work, housework or school activities: A little bit of difficulty  Your usual hobbies, recreational or sporting activities: Quite a bit of difficulty  Getting into or out of the bath: Moderate difficulty  Walking between rooms: Moderate difficulty  Putting on your shoes or socks: Quite a bit of difficulty  Squatting: Quite a bit of difficulty  Lifting an object, like a bag of groceries from the floor: Moderate difficulty  Performing light activities around your home: Moderate difficulty  Performing heavy activities around your home: Quite a bit of difficulty  Getting into or out of a car: Extreme difficulty or unable to perform activity  Walking 2 blocks: Quite a bit of difficulty  Walking a mile: Quite a bit of difficulty  Going up or down 10 stairs (about 1 flight of stairs): Extreme difficulty or unable to perform activity  Standing for 1 hour: Quite a bit of difficulty  Sitting for 1 hour: Quite a bit of difficulty  Running on even ground: Extreme difficulty or unable to perform activity  Running on uneven ground: Extreme difficulty or unable to perform activity  Making sharp turns while running fast: Extreme difficulty or unable to perform activity  Hopping: Extreme difficulty or unable to perform activity  Rolling over in bed: Moderate difficulty  Total: 21  Lower Extremity Functional Index  Any of your usual work, housework or school activities: A little bit of difficulty  Your usual hobbies, recreational or sporting activities: Quite a bit of difficulty  Getting into or out of the bath: Moderate difficulty  Walking between rooms: Moderate difficulty  Putting on your shoes or socks: Quite a bit of difficulty  Squatting: Quite a bit of difficulty  Lifting an object, like a bag of groceries from the floor: Moderate difficulty  Performing light activities around your home: Moderate difficulty  Performing heavy activities around your home: Quite a bit  of difficulty  Getting into or out of a car: Extreme difficulty or unable to perform activity  Walking 2 blocks: Quite a bit of difficulty  Walking a mile: Quite a bit of difficulty  Going up or down 10 stairs (about 1 flight of stairs): Extreme difficulty or unable to perform activity  Standing for 1 hour: Quite a bit of difficulty  Sitting for 1 hour: Quite a bit of difficulty  Running on even ground: Extreme difficulty or unable to perform activity  Running on uneven ground: Extreme difficulty or unable to perform activity  Making sharp turns while running fast: Extreme difficulty or unable to perform activity  Hopping: Extreme difficulty or unable to perform activity  Rolling over in bed: Moderate difficulty  Total: 21  Modified Oswestry  Modified Oswestry Score/Comments: 19/50= 38%      Time Calculation:   Start Time: 0919  Stop Time: 1000  Time Calculation (min): 41 min  Total Timed Code Minutes- PT: 41 minute(s)  Therapy Charges for Today       Code Description Service Date Service Provider Modifiers Qty    60017627420 HC PT THER SUPP EA 15 MIN 9/6/2023 Madhavi Olivo, PT GP 1    74442210643 HC PT THER PROC EA 15 MIN 9/6/2023 Madhavi Olivo, PT GP 2    [id number]  PT THERAPEUTIC ACT EA 15 MIN 9/6/2023 Madhavi Olivo, PT GP 1            PT G-Codes  Outcome Measure Options: Lower Extremity Functional Scale (LEFS), Modified Oswestry  Total: 21  Modified Oswestry Score/Comments: 19/50= 38%         Madhavi Olivo PT, DPT  9/6/2023      Patient is a 85y old  Female who presents with a chief complaint of transfer from [facility] (09 Apr 2025 09:34)      SUBJECTIVE / OVERNIGHT EVENTS:    MEDICATIONS  (STANDING):  albuterol/ipratropium for Nebulization 3 milliLiter(s) Nebulizer every 6 hours  benzocaine/menthol Lozenge 1 Lozenge Oral two times a day  clotrimazole 1% Vaginal Cream 1 Applicatorful Vaginal at bedtime  fluticasone propionate/ salmeterol 250-50 MICROgram(s) Diskus 1 Dose(s) Inhalation two times a day  gabapentin 100 milliGRAM(s) Oral two times a day  lidocaine   4% Patch 1 Patch Transdermal daily  losartan 50 milliGRAM(s) Oral daily  pantoprazole    Tablet 40 milliGRAM(s) Oral before breakfast  polyethylene glycol 3350 17 Gram(s) Oral daily  rosuvastatin 40 milliGRAM(s) Oral at bedtime  senna 2 Tablet(s) Oral at bedtime    MEDICATIONS  (PRN):  acetaminophen     Tablet .. 650 milliGRAM(s) Oral every 6 hours PRN Temp greater or equal to 38C (100.4F), Mild Pain (1 - 3), Moderate Pain (4 - 6)  bismuth subsalicylate Liquid 15 milliLiter(s) Oral every 8 hours PRN ingestion  guaiFENesin Oral Liquid (Sugar-Free) 100 milliGRAM(s) Oral every 6 hours PRN Cough  melatonin 3 milliGRAM(s) Oral at bedtime PRN Insomnia  promethazine 25 milliGRAM(s) Oral every 6 hours PRN allergies  traMADol 50 milliGRAM(s) Oral every 8 hours PRN Severe Pain (7 - 10)      Vital Signs Last 24 Hrs  T(C): 36.7 (10 Apr 2025 06:15), Max: 36.8 (09 Apr 2025 18:00)  T(F): 98 (10 Apr 2025 06:15), Max: 98.2 (09 Apr 2025 18:00)  HR: 82 (10 Apr 2025 06:15) (78 - 88)  BP: 132/57 (10 Apr 2025 06:15) (130/56 - 139/46)  BP(mean): --  RR: 16 (10 Apr 2025 06:15) (16 - 17)  SpO2: 100% (10 Apr 2025 06:15) (99% - 100%)    Parameters below as of 10 Apr 2025 06:15  Patient On (Oxygen Delivery Method): nasal cannula  O2 Flow (L/min): 3    CAPILLARY BLOOD GLUCOSE        I&O's Summary    09 Apr 2025 07:01  -  10 Apr 2025 07:00  --------------------------------------------------------  IN: 990 mL / OUT: 1600 mL / NET: -610 mL        PHYSICAL EXAM:  GENERAL: NAD, well-developed  HEAD:  Atraumatic, Normocephalic  EYES: EOMI, PERRLA, conjunctiva and sclera clear  NECK: Supple, No JVD  CHEST/LUNG: Clear to auscultation bilaterally; No wheeze  HEART: Regular rate and rhythm; No murmurs, rubs, or gallops  ABDOMEN: Soft, Nontender, Nondistended; Bowel sounds present  EXTREMITIES:  2+ Peripheral Pulses, No clubbing, cyanosis, or edema  PSYCH: AAOx3  NEUROLOGY: non-focal  SKIN: No rashes or lesions    LABS:                        8.1    16.28 )-----------( 452      ( 10 Apr 2025 08:23 )             28.4     04-10    144  |  106  |  13  ----------------------------<  89  4.0   |  28  |  0.78    Ca    8.5      10 Apr 2025 08:23  Phos  3.8     04-10  Mg     2.30     04-10            Urinalysis Basic - ( 10 Apr 2025 08:23 )    Color: x / Appearance: x / SG: x / pH: x  Gluc: 89 mg/dL / Ketone: x  / Bili: x / Urobili: x   Blood: x / Protein: x / Nitrite: x   Leuk Esterase: x / RBC: x / WBC x   Sq Epi: x / Non Sq Epi: x / Bacteria: x        RADIOLOGY & ADDITIONAL TESTS:    Imaging Personally Reviewed:    Consultant(s) Notes Reviewed:      Care Discussed with Consultants/Other Providers:   Patient is a 85y old  Female who presents with a chief complaint of transfer from [facility] (09 Apr 2025 09:34)      SUBJECTIVE / OVERNIGHT EVENTS: patient seen and examined by bedside, pt alert and awake, c/o feeling tired , reports loose BM yesterday but has not moved Bowels today , still having cough and chest discomfort with coughing     MEDICATIONS  (STANDING):  albuterol/ipratropium for Nebulization 3 milliLiter(s) Nebulizer every 6 hours  benzocaine/menthol Lozenge 1 Lozenge Oral two times a day  clotrimazole 1% Vaginal Cream 1 Applicatorful Vaginal at bedtime  fluticasone propionate/ salmeterol 250-50 MICROgram(s) Diskus 1 Dose(s) Inhalation two times a day  gabapentin 100 milliGRAM(s) Oral two times a day  lidocaine   4% Patch 1 Patch Transdermal daily  losartan 50 milliGRAM(s) Oral daily  pantoprazole    Tablet 40 milliGRAM(s) Oral before breakfast  polyethylene glycol 3350 17 Gram(s) Oral daily  rosuvastatin 40 milliGRAM(s) Oral at bedtime  senna 2 Tablet(s) Oral at bedtime    MEDICATIONS  (PRN):  acetaminophen     Tablet .. 650 milliGRAM(s) Oral every 6 hours PRN Temp greater or equal to 38C (100.4F), Mild Pain (1 - 3), Moderate Pain (4 - 6)  bismuth subsalicylate Liquid 15 milliLiter(s) Oral every 8 hours PRN ingestion  guaiFENesin Oral Liquid (Sugar-Free) 100 milliGRAM(s) Oral every 6 hours PRN Cough  melatonin 3 milliGRAM(s) Oral at bedtime PRN Insomnia  promethazine 25 milliGRAM(s) Oral every 6 hours PRN allergies  traMADol 50 milliGRAM(s) Oral every 8 hours PRN Severe Pain (7 - 10)      Vital Signs Last 24 Hrs  T(C): 36.7 (10 Apr 2025 06:15), Max: 36.8 (09 Apr 2025 18:00)  T(F): 98 (10 Apr 2025 06:15), Max: 98.2 (09 Apr 2025 18:00)  HR: 82 (10 Apr 2025 06:15) (78 - 88)  BP: 132/57 (10 Apr 2025 06:15) (130/56 - 139/46)  BP(mean): --  RR: 16 (10 Apr 2025 06:15) (16 - 17)  SpO2: 100% (10 Apr 2025 06:15) (99% - 100%)    Parameters below as of 10 Apr 2025 06:15  Patient On (Oxygen Delivery Method): nasal cannula  O2 Flow (L/min): 3    CAPILLARY BLOOD GLUCOSE        I&O's Summary    09 Apr 2025 07:01  -  10 Apr 2025 07:00  --------------------------------------------------------  IN: 990 mL / OUT: 1600 mL / NET: -610 mL    PHYSICAL EXAM:  CONSTITUTIONAL: NAD,  EYES: PERRLA; conjunctiva and sclera clear  NECK: Supple, no palpable masses;  RESPIRATORY: Normal respiratory effort; basilar crackles  bilaterally  CARDIOVASCULAR: Regular rate and rhythm, normal S1 and S2, no murmur/rub/gallop;   ABDOMEN: Nontender to palpation, normoactive bowel sounds, no rebound/guarding;   MUSCULOSKELETAL:    no clubbing or cyanosis of digits; no joint swelling or tenderness to palpation  PSYCH: A+O to person, place, and time; affect appropriate  NEUROLOGY: CN 2-12 are intact and symmetric; no gross sensory deficits;     LABS:                        8.1    16.28 )-----------( 452      ( 10 Apr 2025 08:23 )             28.4     04-10    144  |  106  |  13  ----------------------------<  89  4.0   |  28  |  0.78    Ca    8.5      10 Apr 2025 08:23  Phos  3.8     04-10  Mg     2.30     04-10            Urinalysis Basic - ( 10 Apr 2025 08:23 )    Color: x / Appearance: x / SG: x / pH: x  Gluc: 89 mg/dL / Ketone: x  / Bili: x / Urobili: x   Blood: x / Protein: x / Nitrite: x   Leuk Esterase: x / RBC: x / WBC x   Sq Epi: x / Non Sq Epi: x / Bacteria: x        RADIOLOGY & ADDITIONAL TESTS:  < from: TTE W or WO Ultrasound Enhancing Agent (04.09.25 @ 15:12) >  CONCLUSIONS:      1. Left ventricular cavity is normal in size. Left ventricular systolic function is normal with an ejection fraction of 67 % by Yan's method of disks.   2. There is mild (grade 1) left ventricular diastolic dysfunction.   3. Normal right ventricular cavity size and normal right ventricular systolic function.   4. Left atrium is normal in size.   5. The right atrium is dilated.   6. A a transcatheter deployed (TAVR) is present in theaortic position. The peak transaortic velocity is 2.07 m/s, peak transaortic gradient is 17.1 mmHg and mean transaortic gradient is 10.0 mmHg with an LVOT/aortic valve VTI ratio of 0.66. There is no regurgitation.   7. No pericardial effusion seen.  8. Estimated pulmonary artery systolic pressure is 48 mmHg.   9. The inferior vena cava is normal in size measuring 2.02 cm in diameter, (normal <2.1cm) with abnormal inspiratory collapse (abnormal <50%) consistent with mildly elevated right atrial pressure (~8, range 5-10mmHg).    < end of copied text >    Imaging Personally Reviewed:    Consultant(s) Notes Reviewed:      Care Discussed with Consultants/Other Providers: